# Patient Record
Sex: MALE | Race: WHITE | NOT HISPANIC OR LATINO | Employment: OTHER | URBAN - METROPOLITAN AREA
[De-identification: names, ages, dates, MRNs, and addresses within clinical notes are randomized per-mention and may not be internally consistent; named-entity substitution may affect disease eponyms.]

---

## 2024-02-20 ENCOUNTER — ANESTHESIA EVENT (OUTPATIENT)
Dept: SURGERY | Facility: CLINIC | Age: 69
End: 2024-02-20
Payer: MEDICARE

## 2024-02-20 NOTE — ANESTHESIA PREPROCEDURE EVALUATION
Anesthesia Pre-Procedure Evaluation    Patient: David Ortiz   MRN: [id number] : 1955        Procedure : Procedure(s):  Right Wrist Scaphoid Excision, Possible Triquetral Excision and Capitolunate Arthrodesis  Iliac Crest Bone Graft          No past medical history on file.   SHOULDER SURGERY   Not on File   Social History     Tobacco Use    Smoking status: Not on file    Smokeless tobacco: Not on file   Substance Use Topics    Alcohol use: Not on file      Wt Readings from Last 1 Encounters:   No data found for Wt        Anesthesia Evaluation   Pt has had prior anesthetic. Type: General.        ROS/MED HX  ENT/Pulmonary:     (+)                      asthma                  Neurologic:       Cardiovascular:     (+) Dyslipidemia hypertension- -  CAD -  - -   Taking blood thinners                     dysrhythmias, a-fib,             METS/Exercise Tolerance:     Hematologic:       Musculoskeletal:   (+)  arthritis,             GI/Hepatic:     (+) GERD,                   Renal/Genitourinary:     (+) renal disease, type: CRI,            Endo:     (+)               Obesity,       Psychiatric/Substance Use:     (+) psychiatric history depression       Infectious Disease:       Malignancy:       Other:      (+)  , H/O Chronic Pain,         Physical Exam    Airway        Mallampati: II   TM distance: > 3 FB   Neck ROM: full   Mouth opening: > 3 cm    Respiratory Devices and Support         Dental       (+) Minor Abnormalities - some fillings, tiny chips      Cardiovascular   cardiovascular exam normal       Rhythm and rate: regular and normal     Pulmonary           breath sounds clear to auscultation           OUTSIDE LABS:  CBC:   Lab Results   Component Value Date    WBC 7.2 2006    HGB 16.5 2006    HCT 49.4 2006     2006     BMP:   Lab Results   Component Value Date     2006    POTASSIUM 4.2 2006    CHLORIDE 107 2006    CO2 24 2006    BUN 26  "09/19/2006    CR 1.50 09/19/2006     09/19/2006     COAGS: No results found for: \"PTT\", \"INR\", \"FIBR\"  POC: No results found for: \"BGM\", \"HCG\", \"HCGS\"  HEPATIC:   Lab Results   Component Value Date    ALBUMIN 4.6 09/19/2006    PROTTOTAL 8.2 09/19/2006    ALT 44 09/19/2006    AST 28 09/19/2006    GGT 46 09/19/2006    ALKPHOS 85 09/19/2006    BILITOTAL 0.5 09/19/2006     OTHER:   Lab Results   Component Value Date    JOJO 10.1 09/19/2006       Anesthesia Plan    ASA Status:  2    NPO Status:  NPO Appropriate    Anesthesia Type: General.     - Airway: LMA   Induction: Intravenous, Propofol.   Maintenance: Balanced.        Consents    Anesthesia Plan(s) and associated risks, benefits, and realistic alternatives discussed. Questions answered and patient/representative(s) expressed understanding.     - Discussed: Risks, Benefits and Alternatives for BOTH SEDATION and the PROCEDURE were discussed     - Discussed with:  Patient            Postoperative Care    Pain management: Peripheral nerve block (Single Shot), IV analgesics, Oral pain medications, Multi-modal analgesia.   PONV prophylaxis: Ondansetron (or other 5HT-3), Dexamethasone or Solumedrol     Comments:               SAUL Casas CRNA    I have reviewed the pertinent notes and labs in the chart from the past 30 days and (re)examined the patient.  Any updates or changes from those notes are reflected in this note.                  "

## 2024-03-05 RX ORDER — TRAZODONE HYDROCHLORIDE 50 MG/1
100-150 TABLET, FILM COATED ORAL
COMMUNITY
Start: 2024-02-29 | End: 2024-03-05

## 2024-03-05 RX ORDER — TRAZODONE HYDROCHLORIDE 50 MG/1
50 TABLET, FILM COATED ORAL AT BEDTIME
COMMUNITY

## 2024-03-05 RX ORDER — TRAMADOL HYDROCHLORIDE 50 MG/1
1 TABLET ORAL EVERY 6 HOURS PRN
COMMUNITY
Start: 2024-02-29

## 2024-03-05 RX ORDER — ACETAMINOPHEN 500 MG
1000 TABLET ORAL
COMMUNITY

## 2024-03-05 RX ORDER — QUETIAPINE FUMARATE 200 MG/1
1 TABLET, FILM COATED ORAL AT BEDTIME
COMMUNITY
Start: 2024-02-29

## 2024-03-05 RX ORDER — NITROGLYCERIN 0.4 MG/1
0.4 TABLET SUBLINGUAL
COMMUNITY
Start: 2024-02-29

## 2024-03-05 RX ORDER — ALLOPURINOL 100 MG/1
1 TABLET ORAL DAILY
COMMUNITY
Start: 2023-03-20

## 2024-03-05 RX ORDER — BUDESONIDE AND FORMOTEROL FUMARATE DIHYDRATE 160; 4.5 UG/1; UG/1
2 AEROSOL RESPIRATORY (INHALATION)
COMMUNITY
Start: 2022-10-18

## 2024-03-05 RX ORDER — LOSARTAN POTASSIUM 50 MG/1
1 TABLET ORAL DAILY
COMMUNITY
Start: 2023-03-24

## 2024-03-05 RX ORDER — METHOCARBAMOL 750 MG/1
750 TABLET, FILM COATED ORAL
COMMUNITY
Start: 2022-10-18

## 2024-03-05 RX ORDER — METOPROLOL SUCCINATE 25 MG/1
1 TABLET, EXTENDED RELEASE ORAL EVERY MORNING
COMMUNITY
Start: 2023-01-23

## 2024-03-05 RX ORDER — SIMVASTATIN 40 MG
1 TABLET ORAL DAILY
COMMUNITY
Start: 2023-01-23

## 2024-03-05 RX ORDER — WARFARIN SODIUM 5 MG/1
5 TABLET ORAL
COMMUNITY
Start: 2023-02-13

## 2024-03-12 ENCOUNTER — HOSPITAL ENCOUNTER (OUTPATIENT)
Facility: CLINIC | Age: 69
Discharge: HOME | End: 2024-03-12
Attending: ORTHOPAEDIC SURGERY | Admitting: ORTHOPAEDIC SURGERY
Payer: MEDICARE

## 2024-03-12 ENCOUNTER — ANESTHESIA (OUTPATIENT)
Dept: SURGERY | Facility: CLINIC | Age: 69
End: 2024-03-12
Payer: MEDICARE

## 2024-03-12 ENCOUNTER — APPOINTMENT (OUTPATIENT)
Dept: GENERAL RADIOLOGY | Facility: CLINIC | Age: 69
End: 2024-03-12
Attending: ORTHOPAEDIC SURGERY
Payer: MEDICARE

## 2024-03-12 VITALS
HEIGHT: 72 IN | DIASTOLIC BLOOD PRESSURE: 79 MMHG | HEART RATE: 67 BPM | OXYGEN SATURATION: 94 % | TEMPERATURE: 97 F | WEIGHT: 257 LBS | BODY MASS INDEX: 34.81 KG/M2 | RESPIRATION RATE: 15 BRPM | SYSTOLIC BLOOD PRESSURE: 125 MMHG

## 2024-03-12 DIAGNOSIS — M19.039 WRIST ARTHRITIS: Primary | ICD-10-CM

## 2024-03-12 LAB
ERYTHROCYTE [DISTWIDTH] IN BLOOD BY AUTOMATED COUNT: 16.4 % (ref 10–15)
HCT VFR BLD AUTO: 46.6 % (ref 40–53)
HGB BLD-MCNC: 15 G/DL (ref 13.3–17.7)
INR PPP: 1.12 (ref 0.85–1.15)
MCH RBC QN AUTO: 28.1 PG (ref 26.5–33)
MCHC RBC AUTO-ENTMCNC: 32.2 G/DL (ref 31.5–36.5)
MCV RBC AUTO: 87 FL (ref 78–100)
PLATELET # BLD AUTO: 285 10E3/UL (ref 150–450)
RBC # BLD AUTO: 5.33 10E6/UL (ref 4.4–5.9)
WBC # BLD AUTO: 9.1 10E3/UL (ref 4–11)

## 2024-03-12 PROCEDURE — 85610 PROTHROMBIN TIME: CPT

## 2024-03-12 PROCEDURE — 250N000013 HC RX MED GY IP 250 OP 250 PS 637: Performed by: NURSE ANESTHETIST, CERTIFIED REGISTERED

## 2024-03-12 PROCEDURE — 710N000012 HC RECOVERY PHASE 2, PER MINUTE: Performed by: ORTHOPAEDIC SURGERY

## 2024-03-12 PROCEDURE — 272N000001 HC OR GENERAL SUPPLY STERILE: Performed by: ORTHOPAEDIC SURGERY

## 2024-03-12 PROCEDURE — 250N000009 HC RX 250: Performed by: NURSE ANESTHETIST, CERTIFIED REGISTERED

## 2024-03-12 PROCEDURE — 250N000011 HC RX IP 250 OP 636: Performed by: PHYSICIAN ASSISTANT

## 2024-03-12 PROCEDURE — 710N000009 HC RECOVERY PHASE 1, LEVEL 1, PER MIN: Performed by: ORTHOPAEDIC SURGERY

## 2024-03-12 PROCEDURE — 250N000011 HC RX IP 250 OP 636

## 2024-03-12 PROCEDURE — 250N000013 HC RX MED GY IP 250 OP 250 PS 637: Performed by: PHYSICIAN ASSISTANT

## 2024-03-12 PROCEDURE — 360N000083 HC SURGERY LEVEL 3 W/ FLUORO, PER MIN: Performed by: ORTHOPAEDIC SURGERY

## 2024-03-12 PROCEDURE — 36415 COLL VENOUS BLD VENIPUNCTURE: CPT

## 2024-03-12 PROCEDURE — 999N000141 HC STATISTIC PRE-PROCEDURE NURSING ASSESSMENT: Performed by: ORTHOPAEDIC SURGERY

## 2024-03-12 PROCEDURE — 258N000003 HC RX IP 258 OP 636: Performed by: NURSE ANESTHETIST, CERTIFIED REGISTERED

## 2024-03-12 PROCEDURE — 250N000011 HC RX IP 250 OP 636: Performed by: NURSE ANESTHETIST, CERTIFIED REGISTERED

## 2024-03-12 PROCEDURE — 250N000025 HC SEVOFLURANE, PER MIN: Performed by: ORTHOPAEDIC SURGERY

## 2024-03-12 PROCEDURE — 85027 COMPLETE CBC AUTOMATED: CPT

## 2024-03-12 PROCEDURE — C1713 ANCHOR/SCREW BN/BN,TIS/BN: HCPCS | Performed by: ORTHOPAEDIC SURGERY

## 2024-03-12 PROCEDURE — 999N000179 XR SURGERY CARM FLUORO LESS THAN 5 MIN W STILLS: Mod: TC

## 2024-03-12 PROCEDURE — 250N000009 HC RX 250: Performed by: ORTHOPAEDIC SURGERY

## 2024-03-12 PROCEDURE — 370N000017 HC ANESTHESIA TECHNICAL FEE, PER MIN: Performed by: ORTHOPAEDIC SURGERY

## 2024-03-12 PROCEDURE — 250N000011 HC RX IP 250 OP 636: Performed by: ORTHOPAEDIC SURGERY

## 2024-03-12 PROCEDURE — 88304 TISSUE EXAM BY PATHOLOGIST: CPT | Mod: TC | Performed by: ORTHOPAEDIC SURGERY

## 2024-03-12 DEVICE — 30.0MM, MINI ACUTRAK 2® BONE SCREW
Type: IMPLANTABLE DEVICE | Site: WRIST | Status: FUNCTIONAL
Brand: ACUMED

## 2024-03-12 DEVICE — IMP WIRE KIRSCHNER 0.062X4" 1646-10-000: Type: IMPLANTABLE DEVICE | Site: WRIST | Status: FUNCTIONAL

## 2024-03-12 DEVICE — IMP WIRE KIRSCHNER 0.045X4" 1611-10-000: Type: IMPLANTABLE DEVICE | Site: WRIST | Status: FUNCTIONAL

## 2024-03-12 DEVICE — 26.0MM, MINI ACUTRAK 2® BONE SCREW
Type: IMPLANTABLE DEVICE | Site: WRIST | Status: FUNCTIONAL
Brand: ACUMED

## 2024-03-12 DEVICE — IMP WIRE KIRSCHNER 0.054X4" 1645-10-000: Type: IMPLANTABLE DEVICE | Site: WRIST | Status: FUNCTIONAL

## 2024-03-12 RX ORDER — FENTANYL CITRATE 50 UG/ML
25 INJECTION, SOLUTION INTRAMUSCULAR; INTRAVENOUS EVERY 5 MIN PRN
Status: DISCONTINUED | OUTPATIENT
Start: 2024-03-12 | End: 2024-03-12 | Stop reason: HOSPADM

## 2024-03-12 RX ORDER — FENTANYL CITRATE 0.05 MG/ML
INJECTION, SOLUTION INTRAMUSCULAR; INTRAVENOUS PRN
Status: DISCONTINUED | OUTPATIENT
Start: 2024-03-12 | End: 2024-03-12

## 2024-03-12 RX ORDER — HYDROXYZINE PAMOATE 25 MG/1
25 CAPSULE ORAL 4 TIMES DAILY PRN
Qty: 18 CAPSULE | Refills: 0 | Status: SHIPPED | OUTPATIENT
Start: 2024-03-12

## 2024-03-12 RX ORDER — HYDROMORPHONE HCL IN WATER/PF 6 MG/30 ML
0.4 PATIENT CONTROLLED ANALGESIA SYRINGE INTRAVENOUS EVERY 5 MIN PRN
Status: DISCONTINUED | OUTPATIENT
Start: 2024-03-12 | End: 2024-03-12 | Stop reason: HOSPADM

## 2024-03-12 RX ORDER — OXYCODONE HYDROCHLORIDE 5 MG/1
5 TABLET ORAL
Status: DISCONTINUED | OUTPATIENT
Start: 2024-03-12 | End: 2024-03-12 | Stop reason: HOSPADM

## 2024-03-12 RX ORDER — ONDANSETRON 4 MG/1
4 TABLET, ORALLY DISINTEGRATING ORAL EVERY 30 MIN PRN
Status: DISCONTINUED | OUTPATIENT
Start: 2024-03-12 | End: 2024-03-12 | Stop reason: HOSPADM

## 2024-03-12 RX ORDER — SODIUM CHLORIDE, SODIUM LACTATE, POTASSIUM CHLORIDE, CALCIUM CHLORIDE 600; 310; 30; 20 MG/100ML; MG/100ML; MG/100ML; MG/100ML
INJECTION, SOLUTION INTRAVENOUS CONTINUOUS
Status: DISCONTINUED | OUTPATIENT
Start: 2024-03-12 | End: 2024-03-12 | Stop reason: HOSPADM

## 2024-03-12 RX ORDER — LIDOCAINE 40 MG/G
CREAM TOPICAL
Status: DISCONTINUED | OUTPATIENT
Start: 2024-03-12 | End: 2024-03-12 | Stop reason: HOSPADM

## 2024-03-12 RX ORDER — OXYCODONE HYDROCHLORIDE 5 MG/1
10 TABLET ORAL
Status: DISCONTINUED | OUTPATIENT
Start: 2024-03-12 | End: 2024-03-12 | Stop reason: HOSPADM

## 2024-03-12 RX ORDER — FENTANYL CITRATE 50 UG/ML
25 INJECTION, SOLUTION INTRAMUSCULAR; INTRAVENOUS
Status: DISCONTINUED | OUTPATIENT
Start: 2024-03-12 | End: 2024-03-12 | Stop reason: HOSPADM

## 2024-03-12 RX ORDER — OXYCODONE HYDROCHLORIDE 5 MG/1
5 TABLET ORAL
Status: COMPLETED | OUTPATIENT
Start: 2024-03-12 | End: 2024-03-12

## 2024-03-12 RX ORDER — OXYCODONE HYDROCHLORIDE 5 MG/1
5-10 TABLET ORAL EVERY 4 HOURS PRN
Qty: 24 TABLET | Refills: 0 | Status: SHIPPED | OUTPATIENT
Start: 2024-03-12

## 2024-03-12 RX ORDER — FENTANYL CITRATE 50 UG/ML
50 INJECTION, SOLUTION INTRAMUSCULAR; INTRAVENOUS EVERY 5 MIN PRN
Status: DISCONTINUED | OUTPATIENT
Start: 2024-03-12 | End: 2024-03-12 | Stop reason: HOSPADM

## 2024-03-12 RX ORDER — LIDOCAINE HYDROCHLORIDE 20 MG/ML
INJECTION, SOLUTION INFILTRATION; PERINEURAL PRN
Status: DISCONTINUED | OUTPATIENT
Start: 2024-03-12 | End: 2024-03-12

## 2024-03-12 RX ORDER — ONDANSETRON 2 MG/ML
4 INJECTION INTRAMUSCULAR; INTRAVENOUS EVERY 30 MIN PRN
Status: DISCONTINUED | OUTPATIENT
Start: 2024-03-12 | End: 2024-03-12 | Stop reason: HOSPADM

## 2024-03-12 RX ORDER — MAGNESIUM HYDROXIDE 1200 MG/15ML
LIQUID ORAL PRN
Status: DISCONTINUED | OUTPATIENT
Start: 2024-03-12 | End: 2024-03-12 | Stop reason: HOSPADM

## 2024-03-12 RX ORDER — NALOXONE HYDROCHLORIDE 0.4 MG/ML
0.1 INJECTION, SOLUTION INTRAMUSCULAR; INTRAVENOUS; SUBCUTANEOUS
Status: DISCONTINUED | OUTPATIENT
Start: 2024-03-12 | End: 2024-03-12 | Stop reason: HOSPADM

## 2024-03-12 RX ORDER — GABAPENTIN 100 MG/1
100 CAPSULE ORAL ONCE
Status: COMPLETED | OUTPATIENT
Start: 2024-03-12 | End: 2024-03-12

## 2024-03-12 RX ORDER — ACETAMINOPHEN 325 MG/1
975 TABLET ORAL ONCE
Status: DISCONTINUED | OUTPATIENT
Start: 2024-03-12 | End: 2024-03-12

## 2024-03-12 RX ORDER — PROPOFOL 10 MG/ML
INJECTION, EMULSION INTRAVENOUS PRN
Status: DISCONTINUED | OUTPATIENT
Start: 2024-03-12 | End: 2024-03-12

## 2024-03-12 RX ORDER — ROPIVACAINE HYDROCHLORIDE 5 MG/ML
INJECTION, SOLUTION EPIDURAL; INFILTRATION; PERINEURAL
Status: COMPLETED | OUTPATIENT
Start: 2024-03-12 | End: 2024-03-12

## 2024-03-12 RX ORDER — ONDANSETRON 2 MG/ML
INJECTION INTRAMUSCULAR; INTRAVENOUS PRN
Status: DISCONTINUED | OUTPATIENT
Start: 2024-03-12 | End: 2024-03-12

## 2024-03-12 RX ORDER — DEXAMETHASONE SODIUM PHOSPHATE 4 MG/ML
INJECTION, SOLUTION INTRA-ARTICULAR; INTRALESIONAL; INTRAMUSCULAR; INTRAVENOUS; SOFT TISSUE PRN
Status: DISCONTINUED | OUTPATIENT
Start: 2024-03-12 | End: 2024-03-12

## 2024-03-12 RX ORDER — CEFAZOLIN SODIUM/WATER 2 G/20 ML
2 SYRINGE (ML) INTRAVENOUS
Status: COMPLETED | OUTPATIENT
Start: 2024-03-12 | End: 2024-03-12

## 2024-03-12 RX ORDER — CEFAZOLIN SODIUM/WATER 2 G/20 ML
2 SYRINGE (ML) INTRAVENOUS SEE ADMIN INSTRUCTIONS
Status: DISCONTINUED | OUTPATIENT
Start: 2024-03-12 | End: 2024-03-12 | Stop reason: HOSPADM

## 2024-03-12 RX ORDER — DEXAMETHASONE SODIUM PHOSPHATE 10 MG/ML
INJECTION, SOLUTION INTRAMUSCULAR; INTRAVENOUS
Status: COMPLETED | OUTPATIENT
Start: 2024-03-12 | End: 2024-03-12

## 2024-03-12 RX ORDER — HYDROMORPHONE HCL IN WATER/PF 6 MG/30 ML
0.2 PATIENT CONTROLLED ANALGESIA SYRINGE INTRAVENOUS EVERY 5 MIN PRN
Status: DISCONTINUED | OUTPATIENT
Start: 2024-03-12 | End: 2024-03-12 | Stop reason: HOSPADM

## 2024-03-12 RX ORDER — BUPIVACAINE HYDROCHLORIDE 5 MG/ML
INJECTION, SOLUTION PERINEURAL PRN
Status: DISCONTINUED | OUTPATIENT
Start: 2024-03-12 | End: 2024-03-12 | Stop reason: HOSPADM

## 2024-03-12 RX ORDER — ACETAMINOPHEN 325 MG/1
975 TABLET ORAL ONCE
Status: COMPLETED | OUTPATIENT
Start: 2024-03-12 | End: 2024-03-12

## 2024-03-12 RX ADMIN — ACETAMINOPHEN 975 MG: 325 TABLET, FILM COATED ORAL at 12:57

## 2024-03-12 RX ADMIN — DEXAMETHASONE SODIUM PHOSPHATE 4 MG: 10 INJECTION, SOLUTION INTRAMUSCULAR; INTRAVENOUS at 13:38

## 2024-03-12 RX ADMIN — PROPOFOL 200 MG: 10 INJECTION, EMULSION INTRAVENOUS at 15:02

## 2024-03-12 RX ADMIN — GABAPENTIN 100 MG: 100 CAPSULE ORAL at 12:57

## 2024-03-12 RX ADMIN — MIDAZOLAM 2 MG: 1 INJECTION INTRAMUSCULAR; INTRAVENOUS at 14:58

## 2024-03-12 RX ADMIN — DEXAMETHASONE SODIUM PHOSPHATE 4 MG: 4 INJECTION, SOLUTION INTRA-ARTICULAR; INTRALESIONAL; INTRAMUSCULAR; INTRAVENOUS; SOFT TISSUE at 15:09

## 2024-03-12 RX ADMIN — FENTANYL CITRATE 100 MCG: 0.05 INJECTION, SOLUTION INTRAMUSCULAR; INTRAVENOUS at 14:58

## 2024-03-12 RX ADMIN — MIDAZOLAM 2 MG: 1 INJECTION INTRAMUSCULAR; INTRAVENOUS at 13:23

## 2024-03-12 RX ADMIN — ONDANSETRON 4 MG: 2 INJECTION INTRAMUSCULAR; INTRAVENOUS at 15:09

## 2024-03-12 RX ADMIN — OXYCODONE HYDROCHLORIDE 5 MG: 5 TABLET ORAL at 18:30

## 2024-03-12 RX ADMIN — SODIUM CHLORIDE, POTASSIUM CHLORIDE, SODIUM LACTATE AND CALCIUM CHLORIDE: 600; 310; 30; 20 INJECTION, SOLUTION INTRAVENOUS at 12:55

## 2024-03-12 RX ADMIN — ROPIVACAINE HYDROCHLORIDE 40 ML: 5 INJECTION, SOLUTION EPIDURAL; INFILTRATION; PERINEURAL at 13:38

## 2024-03-12 RX ADMIN — FENTANYL CITRATE 100 MCG: 0.05 INJECTION, SOLUTION INTRAMUSCULAR; INTRAVENOUS at 13:23

## 2024-03-12 RX ADMIN — LIDOCAINE HYDROCHLORIDE 100 MG: 20 INJECTION, SOLUTION INFILTRATION; PERINEURAL at 15:02

## 2024-03-12 RX ADMIN — Medication 2 G: at 14:58

## 2024-03-12 ASSESSMENT — ACTIVITIES OF DAILY LIVING (ADL)
ADLS_ACUITY_SCORE: 31
ADLS_ACUITY_SCORE: 33
ADLS_ACUITY_SCORE: 34

## 2024-03-12 ASSESSMENT — ENCOUNTER SYMPTOMS: DYSRHYTHMIAS: 1

## 2024-03-12 NOTE — ANESTHESIA POSTPROCEDURE EVALUATION
Patient: David Ortiz    Procedure: Procedure(s):  Right Wrist Scaphoid Excision, Triquetral Excision and Capitolunate Arthrodesis  Iliac Crest Bone Graft Right       Anesthesia Type:  General    Note:  Disposition: Outpatient   Postop Pain Control: Uneventful            Sign Out: Well controlled pain   PONV: No   Neuro/Psych: Uneventful            Sign Out: Acceptable/Baseline neuro status   Airway/Respiratory: Uneventful            Sign Out: Acceptable/Baseline resp. status   CV/Hemodynamics: Uneventful            Sign Out: Acceptable CV status; No obvious hypovolemia; No obvious fluid overload   Other NRE: NONE   DID A NON-ROUTINE EVENT OCCUR? No           Last vitals:  Vitals Value Taken Time   /84 03/12/24 1700   Temp 35.9  C (96.62  F) 03/12/24 1710   Pulse 61 03/12/24 1710   Resp 12 03/12/24 1710   SpO2 93 % 03/12/24 1710   Vitals shown include unfiled device data.    Electronically Signed By: SAUL Atkinson CRNA  March 12, 2024  5:12 PM

## 2024-03-12 NOTE — ANESTHESIA PROCEDURE NOTES
Airway       Patient location during procedure: OR       Procedure Start/Stop Times: 3/12/2024 3:05 PM  Staff -        CRNA: Avi Recinos APRN CRNA       Performed By: CRNA  Consent for Airway        Urgency: elective  Indications and Patient Condition       Indications for airway management: livan-procedural       Induction type:intravenous       Mask difficulty assessment: 1 - vent by mask    Final Airway Details       Final airway type: supraglottic airway    Supraglottic Airway Details        Type: LMA       LMA size: 5    Post intubation assessment        Placement verified by: capnometry, equal breath sounds and chest rise        Number of attempts at approach: 1       Secured with: tape       Ease of procedure: easy       Dentition: Intact and Unchanged    Medication(s) Administered   Medication Administration Time: 3/12/2024 3:05 PM

## 2024-03-12 NOTE — ANESTHESIA CARE TRANSFER NOTE
Patient: David Ortiz    Procedure: Procedure(s):  Right Wrist Scaphoid Excision, Triquetral Excision and Capitolunate Arthrodesis  Iliac Crest Bone Graft Right       Diagnosis: SLAC (scapholunate advanced collapse) of wrist [M19.139]  Diagnosis Additional Information: No value filed.    Anesthesia Type:   General     Note:    Oropharynx: oropharynx clear of all foreign objects and spontaneously breathing  Level of Consciousness: awake and drowsy  Oxygen Supplementation: room air    Independent Airway: airway patency satisfactory and stable  Dentition: dentition unchanged  Vital Signs Stable: post-procedure vital signs reviewed and stable  Report to RN Given: handoff report given  Patient transferred to: Phase II    Handoff Report: Identifed the Patient, Identified the Reponsible Provider, Reviewed the pertinent medical history, Discussed the surgical course, Reviewed Intra-OP anesthesia mangement and issues during anesthesia, Set expectations for post-procedure period and Allowed opportunity for questions and acknowledgement of understanding      Vitals:  Vitals Value Taken Time   BP     Temp     Pulse     Resp     SpO2         Electronically Signed By: SAUL Atkinson CRNA  March 12, 2024  4:51 PM

## 2024-03-12 NOTE — ANESTHESIA PROCEDURE NOTES
Brachial plexus Procedure Note    Pre-Procedure   Staff -        CRNA: Benson Thao APRN CRNA       Performed By: CRNA       Procedure Start/Stop Times: 3/12/2024 1:23 PM and 3/12/2024 1:40 PM       Pre-Anesthestic Checklist: patient identified, IV checked, site marked, risks and benefits discussed, informed consent, monitors and equipment checked, pre-op evaluation, at physician/surgeon's request and post-op pain management  Timeout:       Correct Patient: Yes        Correct Procedure: Yes        Correct Site: Yes        Correct Position: Yes        Correct Laterality: Yes        Site Marked: Yes  Procedure Documentation  Procedure: Brachial plexus       Laterality: right       Patient Position: supine       Patient Prep/Sterile Barriers: sterile gloves, mask, patient draped       Skin prep: Chloraprep (infra-clavicular approach).       Needle Type: insulated       Needle Gauge: 21.        Needle Length (Inches): 4        Ultrasound guided       1. Ultrasound was used to identify targeted nerve, plexus, vascular marker, or fascial plane and place a needle adjacent to it in real-time.       2. Ultrasound was used to visualize the spread of anesthetic in close proximity to the above referenced structure.       3. A permanent image is entered into the patient's record.       4. The visualized anatomic structures appeared normal.       5. There were no apparent abnormal pathologic findings.    Assessment/Narrative         The placement was negative for: blood aspirated, painful injection and site bleeding       Paresthesias: No.       Bolus given via needle. no blood aspirated via catheter.        Secured via.        Insertion/Infusion Method: Single Shot       Complications: none       Injection made incrementally with aspirations every 5 mL.    Medication(s) Administered   Ropivacaine 0.5% PF (Infiltration) - Infiltration   40 mL - 3/12/2024 1:38:00 PM  Dexamethasone 10 mg/mL PF (Perineural) - Perineural   4 mg  "- 3/12/2024 1:38:00 PM  Medication Administration Time: 3/12/2024 1:23 PM     Comments:  Patient tolerated procedure well      FOR [facility] ONLY:   Pain Team Contact information: please page the Pain Team Via [employer]. Search \"Pain\". During daytime hours, please page the attending first. At night please page the resident first.      "

## 2024-03-12 NOTE — OP NOTE
Preoperative diagnosis:  Right wrist chronic pain  Right wrist scapholunate advanced collapse, stage III, arthritis    Postoperative diagnosis:  Right wrist chronic pain  Right wrist scapholunate advanced collapse, stage III, arthritis    Procedure:  Excision right scaphoid and triquetrum  Right wrist Lunate arthrodesis with iliac crest bone graft  Right wrist posterior interosseous neurectomy    Anesthesia: General with regional    Surgeon: Samy Cheatham MD    Assistant: Sonja Burgos PA-C    Procedure: The patient was taken to the main operating suite.  Once there he was transferred over the operating table in supine position.  He was induced per anesthesia.  The right upper extremity was prepped and draped in usual sterile fashion as was the right iliac crest.  Longitudinal incision was made and full-thickness skin and subcutaneous flaps were raised over the dorsum of the wrist.  The third dorsal compartment was entered and the EPL was removed.  The septum between the third and the fourth as well as the fourth and fifth was divided.  A 2 cm segment of the posterior interosseous nerve was resected in its entirety.  A T-shaped capsulotomy of the wrist was undertaken.  There were small ossicles dorsally which were removed.  There was quite a few crystalline deposits.  There is complete loss of articular cartilage in the scaphoid proximal pole of the capitate the distal pole of the lunate.  Subperiosteal dissection was carried around the scaphoid and it was removed in its entirety in 1 piece.  Similarly this was done to the triquetrum.  We then used a high-speed bur to bur down to a bleeding bed of bone on the proximal pole of the scaphoid and the distal pole of the lunate.  We packed cancellous bone graft between the capitate and the lunate and fixation was achieved with 2 mini AccuTrack screws.  Excellent longitudinal compression and stability was achieved and the DISI alignment was resolved.  The wound was  irrigated and closed with 3-0 FiberWire in a figure-of-eight fashion for the T-shaped capsulotomy.  The dorsal retinaculum was closed with 4-0 FiberWire in a running locked fashion leaving the EPL out.  Skin was closed with 4 nylon in a horizontal mattress fashion.  A dressing consisting of Adaptic gauze 4 x 4 fluffs sterile Webril and a volar plaster splint covered by Ace bandages was applied.  The tourniquet was let down for a total tourniquet time of 79 minutes.    The iliac crest graft was harvested through a 3 cm incision just inferior and posterior to the anterior superior iliac spine.  Sharp dissection was carried onto the superior aspect of the crest and subperiosteal dissection was used to expose the crest.  Small window was made and cancellous bone graft was harvested.  Excellent hemostasis was achieved.  The fascia was closed with 0 Vicryl in a figure-of-eight fashion.  Subcutaneous tissue was closed with 2-0 Vicryl in simple suture fashion.  Skin was closed with 3 oh STRATAFIX in a running subcuticular fashion.  Benzoin and Steri-Strips were applied.  A dressing consisting of Adaptic gauze 4 x 4 fluffs ABDs and tape was then applied.    The patient was taken recovery room in stable condition breathing spontaneously.    The PA was medically necessary to ensure smooth and safe progression of the case.  The PA was critical for protection of the articular structures during the course of excising the scaphoid and triquetrum.  The PA was also critical for exposure of the lunate while performing placement of the hardware/fixation.  The PA was present for the entire case from positioning prepping and draping through wound closure and application of the dressing.

## 2024-03-12 NOTE — DISCHARGE INSTRUCTIONS
Same Day Surgery Discharge Instructions  Special Precautions After Surgery - Adult    It is not unusual to feel lightheaded or faint, up to 24 hours after surgery or while taking pain medication.  If you have these symptoms; sit for a few minutes before standing and have someone assist you when getting up.  You should rest and relax for the next 24 hours and must have someone stay with you for at least 24 hours after your discharge.  DO NOT DRIVE any vehicle or operate mechanical equipment for 24 hours following the end of your surgery.  DO NOT DRIVE while taking narcotic pain medications that have been prescribed by your physician.  If you had a limb operated on, you must be able to use it fully to drive.  DO NOT drink alcoholic beverages for 24 hours following surgery or while taking prescription pain medication.  Drink clear liquids (apple juice, ginger ale, broth, 7-Up, etc.).  Progress to your regular diet as you feel able.  Any questions call your physician and do not make important decisions for 24 hours.     __________________________________________________________________________________________________________________________________  IMPORTANT NUMBERS:    [facility] [place] Main Number:  [phone], [phone]  Pharmacy:  [phone]  Same Day Surgery:  [phone], Monday - Friday until 8:30 p.m.  Urgent Care:  [phone]  Emergency Room:  [phone]        [facility]:  [phone]     Nurse Advice Line: [phone]

## 2024-03-12 NOTE — OR NURSING
[facility] DISCHARGE NOTE    Patient discharged to home at 6:39 PM via wheel chair. Accompanied by spouse and staff. Discharge instructions reviewed with patient and spouse, opportunity offered to ask questions. Prescriptions filled and sent with patient upon discharge. All belongings sent with patient.    Nafisa Lujan RN

## 2024-03-18 LAB
PATH REPORT.COMMENTS IMP SPEC: NORMAL
PATH REPORT.FINAL DX SPEC: NORMAL
PATH REPORT.GROSS SPEC: NORMAL
PATH REPORT.MICROSCOPIC SPEC OTHER STN: NORMAL
PATH REPORT.RELEVANT HX SPEC: NORMAL
PHOTO IMAGE: NORMAL

## 2024-03-18 PROCEDURE — 88341 IMHCHEM/IMCYTCHM EA ADD ANTB: CPT | Mod: 26 | Performed by: PATHOLOGY

## 2024-03-18 PROCEDURE — 88305 TISSUE EXAM BY PATHOLOGIST: CPT | Mod: 26 | Performed by: PATHOLOGY

## 2024-03-18 PROCEDURE — 88342 IMHCHEM/IMCYTCHM 1ST ANTB: CPT | Mod: 26 | Performed by: PATHOLOGY

## 2024-03-18 PROCEDURE — 88311 DECALCIFY TISSUE: CPT | Mod: 26 | Performed by: PATHOLOGY

## 2024-06-10 ENCOUNTER — HOSPITAL ENCOUNTER (OUTPATIENT)
Dept: CT IMAGING | Facility: CLINIC | Age: 69
Discharge: HOME | End: 2024-06-10
Attending: ORTHOPAEDIC SURGERY | Admitting: ORTHOPAEDIC SURGERY
Payer: MEDICARE

## 2024-06-10 DIAGNOSIS — M25.539 WRIST PAIN: ICD-10-CM

## 2024-06-10 PROCEDURE — 73200 CT UPPER EXTREMITY W/O DYE: CPT | Mod: RT

## 2025-06-02 PROBLEM — I25.10 ATHEROSCLEROTIC HEART DISEASE OF NATIVE CORONARY ARTERY WITHOUT ANGINA PECTORIS: Status: ACTIVE | Noted: 2019-03-08

## 2025-06-02 PROBLEM — Z86.711 HISTORY OF PULMONARY EMBOLISM: Status: ACTIVE | Noted: 2023-04-21

## 2025-06-02 PROBLEM — I48.91 ATRIAL FIBRILLATION (H): Status: ACTIVE | Noted: 2023-04-14

## 2025-06-03 NOTE — PROGRESS NOTES
Reason for Consultation: Paroxysmal atrial fibrillation, recent emergency department visit for lightheadedness/presyncope.      History of Presenting Illness: This is a 70-year-old gentleman with a past medical history significant for paroxysmal atrial fibrillation and a prior pulmonary embolism as well as dyslipidemia who was seen in the emergency department in early May of this year for lightheadedness/presyncope.  His emergency department evaluation was essentially unremarkable and cardiology consultation was requested.    He states that he has a long history of presyncope as well as syncope over the past several years although the symptoms are becoming more frequent lately.  He describes a prodrome of dizziness prior to losing consciousness.  The symptoms can occur both post exertion and at rest, and he denies any preceding chest discomfort or palpitations.  He does, however, report dyspnea on exertion which is slightly more noticeable lately.    He has undergone a workup for this in the past in [place] which included cardiac monitoring per his recollection.  This was unrevealing.  He has also undergone coronary angiography in the past which demonstrated nonobstructive coronary artery disease.    He does monitor his blood pressure when the symptoms occur and per his wife his blood pressure has not demonstrated any significant changes with the symptoms.    PMH, FH, SH, Allergies and Meds: As outlined below.    Physical Exam:    Gen: NAD  Respiratory : Clear to Auscultation.  Cardiovascular: RRR, no murmurs  Extremities: No edema.    Labs: Lipid panel on 11/29/2023 demonstrated total cholesterol 148, HDL of 90, LDL of 39 and triglycerides of 94.    Other Cardiac Testing:    He underwent a dobutamine stress echocardiogram on 8/17/2023 which was negative for ischemia.      IMPRESSION:    Recurrent presyncope/syncope as described above.  2.  Paroxysmal atrial fibrillation.  On Xarelto 15 mg daily due to chronic  renal insufficiency.  3.  History of pulmonary embolism in the past.  On Xarelto as above.  4.  Mild to moderate nonobstructive coronary disease on coronary angiography in the past.  5.  Chronic renal insufficiency.          PLAN:    Mr. Ortiz presents for evaluation regarding recurrent syncope/presyncope as described above.  This has been ongoing for several years although it appears to be more frequent this year.  As stated above, the symptoms are typically associated with a prodrome.    We did detailed discussion regarding the potential etiologies for his presyncopal/syncopal episodes.  At this point in time I have recommended a comprehensive cardiac evaluation with an echocardiogram, a Lexiscan stress perfusion study and event monitoring.  I have also ordered carotid Dopplers to rule out significant peripheral vascular disease.    As we discussed, further recommendations will depend on the results of these investigations.  If they are unrevealing we may consider a reduction in his metoprolol dosage to allow for permissive hypertension as well as a neurology referral to rule out alternative etiologies.    It was a pleasure seeing him in consultation today.    Decision making was of high complexity.    The longitudinal plan of care for the diagnosis(es)/condition(s) as documented were addressed during this visit. Due to the added complexity in care, I will continue to support David in the subsequent management and with ongoing continuity of care.            Alberto Thacker M.D.       CURRENT MEDICATIONS:  Current Outpatient Medications   Medication Sig Dispense Refill    acetaminophen (TYLENOL) 500 MG tablet Take 1,000 mg by mouth      allopurinol (ZYLOPRIM) 100 MG tablet Take 1 tablet by mouth daily      budesonide-formoterol (SYMBICORT) 160-4.5 MCG/ACT Inhaler Inhale 2 puffs into the lungs      hydrOXYzine isabel (VISTARIL) 25 MG capsule Take 1 capsule (25 mg) by mouth 4 times daily as needed for itching 18 capsule 0     losartan (COZAAR) 50 MG tablet Take 1 tablet by mouth daily      methocarbamol (ROBAXIN) 750 MG tablet Take 750 mg by mouth      metoprolol succinate ER (TOPROL XL) 25 MG 24 hr tablet Take 1 tablet by mouth every morning      nitroGLYcerin (NITROSTAT) 0.4 MG sublingual tablet Place 0.4 mg under the tongue      omeprazole (PRILOSEC) 20 MG DR capsule Take 1 capsule by mouth daily      oxyCODONE (ROXICODONE) 5 MG tablet Take 1-2 tablets (5-10 mg) by mouth every 4 hours as needed for moderate to severe pain 24 tablet 0    QUEtiapine (SEROQUEL) 200 MG tablet Take 1 tablet by mouth at bedtime      simvastatin (ZOCOR) 40 MG tablet Take 1 tablet by mouth daily      traMADol (ULTRAM) 50 MG tablet Take 1 tablet by mouth every 6 hours as needed      traZODone (DESYREL) 50 MG tablet Take 50 mg by mouth at bedtime      warfarin ANTICOAGULANT (COUMADIN) 5 MG tablet Take 5 mg by mouth         ALLERGIES     Allergies   Allergen Reactions    Ace Inhibitors Other (See Comments) and Unknown     Comment: Cough, Description:     Other reaction(s): Other - Describe In Comment Field   Comment: Cough, Description:     Other reaction(s): Other - Describe In Comment Field   Comment: Cough, Description:    Comment: Cough, Description:    Atorvastatin Nausea    Indomethacin Swelling    Nefazodone      Depression    Venlafaxine Tinnitus     Other reaction(s): Tinnitus       PAST MEDICAL HISTORY:  No past medical history on file.    PAST SURGICAL HISTORY:  Past Surgical History:   Procedure Laterality Date    ARTHRODESIS WRIST Right 3/12/2024    Procedure: Right Wrist Scaphoid Excision, Triquetral Excision and Capitolunate Arthrodesis;  Surgeon: Samy Cheatham MD;  Location: [facility] OR    GRAFT BONE FROM ILIAC CREST Right 3/12/2024    Procedure: Iliac Crest Bone Graft Right;  Surgeon: Samy Cheatham MD;  Location: [facility] OR       FAMILY HISTORY:  No family history on file.    SOCIAL HISTORY:  Social History     Socioeconomic History     Marital status:      Social Drivers of Health     Financial Resource Strain: Not on File (2019)    Received from [facility]    Financial Resource Strain     Financial Resource Strain: 0   Food Insecurity: Not on File (2024)    Received from [facility]    Food Insecurity     Food: 0   Transportation Needs: Not on File (2019)    Received from [facility]    Transportation Needs     Transportation: 0   Physical Activity: Not on File (2019)    Received from [facility]    Physical Activity     Physical Activity: 0   Stress: Not on File (2019)    Received from [facility]    Stress     Stress: 0   Social Connections: Not on File (2024)    Received from [facility]    Social Connections     Connectedness: 0   Interpersonal Safety: Not At Risk (2025)    Received from  and [facility]     Do you feel UNSAFE in any of your personal relationships with your family members or any other acquaintances?: No   Housing Stability: Not on File (2019)    Received from [facility]    Housing Stability     Housin       Review of Systems:  Skin:          Eyes:         ENT:         Respiratory:          Cardiovascular:         Gastroenterology:        Genitourinary:         Musculoskeletal:         Neurologic:         Psychiatric:         Heme/Lymph/Imm:         Endocrine:           Physical Exam:  Vitals: There were no vitals taken for this visit.    Constitutional:           Skin:             Head:           Eyes:           Lymph:      ENT:           Neck:           Respiratory:            Cardiac:                                                           GI:           Extremities and Muscular Skeletal:                 Neurological:           Psych:           CC  Referred Self, MD  No address on file

## 2025-06-04 ENCOUNTER — OFFICE VISIT (OUTPATIENT)
Dept: CARDIOLOGY | Facility: CLINIC | Age: 70
End: 2025-06-04
Payer: MEDICARE

## 2025-06-04 VITALS
DIASTOLIC BLOOD PRESSURE: 85 MMHG | HEIGHT: 72 IN | HEART RATE: 63 BPM | WEIGHT: 269.3 LBS | SYSTOLIC BLOOD PRESSURE: 127 MMHG | OXYGEN SATURATION: 98 % | BODY MASS INDEX: 36.47 KG/M2 | RESPIRATION RATE: 16 BRPM

## 2025-06-04 DIAGNOSIS — R09.89 BRUIT: ICD-10-CM

## 2025-06-04 DIAGNOSIS — I48.0 PAROXYSMAL ATRIAL FIBRILLATION (H): Primary | ICD-10-CM

## 2025-06-04 NOTE — LETTER
6/4/2025    Dangelo Kilpatrick MD  [facility] [address]  [facility] [address]    RE: David Ortiz       Dear Colleague,     I had the pleasure of seeing David Ortiz in the [facility].      Reason for Consultation: Paroxysmal atrial fibrillation, recent emergency department visit for lightheadedness/presyncope.      History of Presenting Illness: This is a 70-year-old gentleman with a past medical history significant for paroxysmal atrial fibrillation and a prior pulmonary embolism as well as dyslipidemia who was seen in the emergency department in early May of this year for lightheadedness/presyncope.  His emergency department evaluation was essentially unremarkable and cardiology consultation was requested.    He states that he has a long history of presyncope as well as syncope over the past several years although the symptoms are becoming more frequent lately.  He describes a prodrome of dizziness prior to losing consciousness.  The symptoms can occur both post exertion and at rest, and he denies any preceding chest discomfort or palpitations.  He does, however, report dyspnea on exertion which is slightly more noticeable lately.    He has undergone a workup for this in the past in [place] which included cardiac monitoring per his recollection.  This was unrevealing.  He has also undergone coronary angiography in the past which demonstrated nonobstructive coronary artery disease.    He does monitor his blood pressure when the symptoms occur and per his wife his blood pressure has not demonstrated any significant changes with the symptoms.    PMH, FH, SH, Allergies and Meds: As outlined below.    Physical Exam:    Gen: NAD  Respiratory : Clear to Auscultation.  Cardiovascular: RRR, no murmurs  Extremities: No edema.    Labs: Lipid panel on 11/29/2023 demonstrated total cholesterol 148, HDL of 90, LDL of 39 and triglycerides of 94.    Other Cardiac  Testing:    He underwent a dobutamine stress echocardiogram on 8/17/2023 which was negative for ischemia.      IMPRESSION:    Recurrent presyncope/syncope as described above.  2.  Paroxysmal atrial fibrillation.  On Xarelto 15 mg daily due to chronic renal insufficiency.  3.  History of pulmonary embolism in the past.  On Xarelto as above.  4.  Mild to moderate nonobstructive coronary disease on coronary angiography in the past.  5.  Chronic renal insufficiency.          PLAN:    Mr. Ortiz presents for evaluation regarding recurrent syncope/presyncope as described above.  This has been ongoing for several years although it appears to be more frequent this year.  As stated above, the symptoms are typically associated with a prodrome.    We did detailed discussion regarding the potential etiologies for his presyncopal/syncopal episodes.  At this point in time I have recommended a comprehensive cardiac evaluation with an echocardiogram, a Lexiscan stress perfusion study and event monitoring.  I have also ordered carotid Dopplers to rule out significant peripheral vascular disease.    As we discussed, further recommendations will depend on the results of these investigations.  If they are unrevealing we may consider a reduction in his metoprolol dosage to allow for permissive hypertension as well as a neurology referral to rule out alternative etiologies.    It was a pleasure seeing him in consultation today.    Decision making was of high complexity.    The longitudinal plan of care for the diagnosis(es)/condition(s) as documented were addressed during this visit. Due to the added complexity in care, I will continue to support David in the subsequent management and with ongoing continuity of care.            Alberto Thacker M.D.       CURRENT MEDICATIONS:  Current Outpatient Medications   Medication Sig Dispense Refill     acetaminophen (TYLENOL) 500 MG tablet Take 1,000 mg by mouth       allopurinol (ZYLOPRIM) 100 MG  tablet Take 1 tablet by mouth daily       budesonide-formoterol (SYMBICORT) 160-4.5 MCG/ACT Inhaler Inhale 2 puffs into the lungs       hydrOXYzine isabel (VISTARIL) 25 MG capsule Take 1 capsule (25 mg) by mouth 4 times daily as needed for itching 18 capsule 0     losartan (COZAAR) 50 MG tablet Take 1 tablet by mouth daily       methocarbamol (ROBAXIN) 750 MG tablet Take 750 mg by mouth       metoprolol succinate ER (TOPROL XL) 25 MG 24 hr tablet Take 1 tablet by mouth every morning       nitroGLYcerin (NITROSTAT) 0.4 MG sublingual tablet Place 0.4 mg under the tongue       omeprazole (PRILOSEC) 20 MG DR capsule Take 1 capsule by mouth daily       oxyCODONE (ROXICODONE) 5 MG tablet Take 1-2 tablets (5-10 mg) by mouth every 4 hours as needed for moderate to severe pain 24 tablet 0     QUEtiapine (SEROQUEL) 200 MG tablet Take 1 tablet by mouth at bedtime       simvastatin (ZOCOR) 40 MG tablet Take 1 tablet by mouth daily       traMADol (ULTRAM) 50 MG tablet Take 1 tablet by mouth every 6 hours as needed       traZODone (DESYREL) 50 MG tablet Take 50 mg by mouth at bedtime       warfarin ANTICOAGULANT (COUMADIN) 5 MG tablet Take 5 mg by mouth         ALLERGIES     Allergies   Allergen Reactions     Ace Inhibitors Other (See Comments) and Unknown     Comment: Cough, Description:     Other reaction(s): Other - Describe In Comment Field   Comment: Cough, Description:     Other reaction(s): Other - Describe In Comment Field   Comment: Cough, Description:    Comment: Cough, Description:     Atorvastatin Nausea     Indomethacin Swelling     Nefazodone      Depression     Venlafaxine Tinnitus     Other reaction(s): Tinnitus       PAST MEDICAL HISTORY:  No past medical history on file.    PAST SURGICAL HISTORY:  Past Surgical History:   Procedure Laterality Date     ARTHRODESIS WRIST Right 3/12/2024    Procedure: Right Wrist Scaphoid Excision, Triquetral Excision and Capitolunate Arthrodesis;  Surgeon: Samy Cheatham MD;   Location: [facility] OR     GRAFT BONE FROM ILIAC CREST Right 3/12/2024    Procedure: Iliac Crest Bone Graft Right;  Surgeon: Samy Cheatham MD;  Location: [facility] OR       FAMILY HISTORY:  No family history on file.    SOCIAL HISTORY:  Social History     Socioeconomic History     Marital status:      Social Drivers of Health     Financial Resource Strain: Not on File (2019)    Received from [facility]    Financial Resource Strain      Financial Resource Strain: 0   Food Insecurity: Not on File (2024)    Received from [facility]    Food Insecurity      Food: 0   Transportation Needs: Not on File (2019)    Received from [facility]    Transportation Needs      Transportation: 0   Physical Activity: Not on File (2019)    Received from [facility]    Physical Activity      Physical Activity: 0   Stress: Not on File (2019)    Received from [facility]    Stress      Stress: 0   Social Connections: Not on File (2024)    Received from [facility]    Social Connections      Connectedness: 0   Interpersonal Safety: Not At Risk (2025)    [facility]      Do you feel UNSAFE in any of your personal relationships with your family members or any other acquaintances?: No   Housing Stability: Not on File (2019)    Received from [facility]    Housing Stability      Housin       Review of Systems:  Skin:          Eyes:         ENT:         Respiratory:          Cardiovascular:         Gastroenterology:        Genitourinary:         Musculoskeletal:         Neurologic:         Psychiatric:         Heme/Lymph/Imm:         Endocrine:           Physical Exam:  Vitals: There were no vitals taken for this visit.    Constitutional:           Skin:             Head:           Eyes:           Lymph:      ENT:           Neck:           Respiratory:            Cardiac:                                                           GI:           Extremities and Muscular  Skeletal:                 Neurological:           Psych:           CC  Referred Self, MD  No address on file                  Thank you for allowing me to participate in the care of your patient.      Sincerely,     Alberto Thacker MD     [facility]  cc:   Referred MD [place]  No address on file

## 2025-06-25 ENCOUNTER — HOSPITAL ENCOUNTER (OUTPATIENT)
Dept: CARDIOLOGY | Facility: CLINIC | Age: 70
Discharge: HOME | End: 2025-06-25
Attending: INTERNAL MEDICINE
Payer: MEDICARE

## 2025-06-25 ENCOUNTER — HOSPITAL ENCOUNTER (OUTPATIENT)
Dept: NUCLEAR MEDICINE | Facility: CLINIC | Age: 70
Setting detail: NUCLEAR MEDICINE
Discharge: HOME | End: 2025-06-25
Attending: INTERNAL MEDICINE
Payer: MEDICARE

## 2025-06-25 ENCOUNTER — HOSPITAL ENCOUNTER (OUTPATIENT)
Dept: CARDIOLOGY | Facility: CLINIC | Age: 70
Setting detail: NUCLEAR MEDICINE
Discharge: HOME | End: 2025-06-25
Attending: INTERNAL MEDICINE
Payer: MEDICARE

## 2025-06-25 ENCOUNTER — RESULTS FOLLOW-UP (OUTPATIENT)
Dept: CARDIOLOGY | Facility: CLINIC | Age: 70
End: 2025-06-25

## 2025-06-25 DIAGNOSIS — I77.810 ASCENDING AORTA DILATION: Primary | ICD-10-CM

## 2025-06-25 DIAGNOSIS — I77.89 AORTIC ROOT ENLARGEMENT: ICD-10-CM

## 2025-06-25 DIAGNOSIS — R09.89 BRUIT: ICD-10-CM

## 2025-06-25 DIAGNOSIS — I48.0 PAROXYSMAL ATRIAL FIBRILLATION (H): ICD-10-CM

## 2025-06-25 LAB
CV BLOOD PRESSURE: 62 MMHG
CV STRESS MAX HR HE: 78
NUC STRESS EJECTION FRACTION: 57 %
RATE PRESSURE PRODUCT: NORMAL
STRESS ECHO BASELINE DIASTOLIC HE: 70
STRESS ECHO BASELINE HR: 60 BPM
STRESS ECHO BASELINE SYSTOLIC BP: 122
STRESS ECHO CALCULATED PERCENT HR: 53 %
STRESS ECHO LAST STRESS DIASTOLIC BP: 70
STRESS ECHO LAST STRESS SYSTOLIC BP: 130
STRESS ECHO TARGET HR: 148
STRESS/REST PERFUSION RATIO: 0.99

## 2025-06-25 PROCEDURE — 78452 HT MUSCLE IMAGE SPECT MULT: CPT

## 2025-06-25 PROCEDURE — 343N000001 HC RX 343 MED OP 636: Performed by: INTERNAL MEDICINE

## 2025-06-25 PROCEDURE — 93880 EXTRACRANIAL BILAT STUDY: CPT

## 2025-06-25 PROCEDURE — 250N000011 HC RX IP 250 OP 636: Performed by: INTERNAL MEDICINE

## 2025-06-25 PROCEDURE — 93016 CV STRESS TEST SUPVJ ONLY: CPT | Performed by: INTERNAL MEDICINE

## 2025-06-25 PROCEDURE — A9502 TC99M TETROFOSMIN: HCPCS | Performed by: INTERNAL MEDICINE

## 2025-06-25 PROCEDURE — 93017 CV STRESS TEST TRACING ONLY: CPT

## 2025-06-25 RX ORDER — REGADENOSON 0.08 MG/ML
0.4 INJECTION, SOLUTION INTRAVENOUS ONCE
Status: COMPLETED | OUTPATIENT
Start: 2025-06-25 | End: 2025-06-25

## 2025-06-25 RX ADMIN — REGADENOSON 0.4 MG: 0.08 INJECTION, SOLUTION INTRAVENOUS at 08:39

## 2025-06-25 RX ADMIN — TETROFOSMIN 37.9 MILLICURIE: 1.38 INJECTION, POWDER, LYOPHILIZED, FOR SOLUTION INTRAVENOUS at 08:45

## 2025-06-25 RX ADMIN — TETROFOSMIN 12.5 MILLICURIE: 1.38 INJECTION, POWDER, LYOPHILIZED, FOR SOLUTION INTRAVENOUS at 07:35

## 2025-06-26 ENCOUNTER — RESULTS FOLLOW-UP (OUTPATIENT)
Dept: CARDIOLOGY | Facility: CLINIC | Age: 70
End: 2025-06-26

## 2025-06-26 ENCOUNTER — OFFICE VISIT (OUTPATIENT)
Dept: CARDIOLOGY | Facility: CLINIC | Age: 70
End: 2025-06-26
Payer: MEDICARE

## 2025-06-26 ENCOUNTER — HOSPITAL ENCOUNTER (OUTPATIENT)
Facility: CLINIC | Age: 70
End: 2025-06-26
Attending: INTERNAL MEDICINE | Admitting: INTERNAL MEDICINE
Payer: MEDICARE

## 2025-06-26 VITALS
RESPIRATION RATE: 16 BRPM | DIASTOLIC BLOOD PRESSURE: 74 MMHG | OXYGEN SATURATION: 94 % | SYSTOLIC BLOOD PRESSURE: 107 MMHG | HEART RATE: 60 BPM | WEIGHT: 265 LBS | BODY MASS INDEX: 35.94 KG/M2

## 2025-06-26 DIAGNOSIS — R55 SYNCOPE, UNSPECIFIED SYNCOPE TYPE: ICD-10-CM

## 2025-06-26 DIAGNOSIS — R94.39 ABNORMAL CARDIOVASCULAR STRESS TEST: Primary | ICD-10-CM

## 2025-06-26 DIAGNOSIS — N18.32 STAGE 3B CHRONIC KIDNEY DISEASE (H): ICD-10-CM

## 2025-06-26 LAB
ANION GAP SERPL CALCULATED.3IONS-SCNC: 13 MMOL/L (ref 7–15)
BUN SERPL-MCNC: 20.8 MG/DL (ref 8–23)
CALCIUM SERPL-MCNC: 9.5 MG/DL (ref 8.8–10.4)
CHLORIDE SERPL-SCNC: 106 MMOL/L (ref 98–107)
CREAT SERPL-MCNC: 1.96 MG/DL (ref 0.67–1.17)
EGFRCR SERPLBLD CKD-EPI 2021: 36 ML/MIN/1.73M2
GLUCOSE SERPL-MCNC: 88 MG/DL (ref 70–99)
HCO3 SERPL-SCNC: 21 MMOL/L (ref 22–29)
HGB BLD-MCNC: 15.4 G/DL (ref 13.3–17.7)
MCV RBC AUTO: 88 FL (ref 78–100)
POTASSIUM SERPL-SCNC: 4.7 MMOL/L (ref 3.4–5.3)
SODIUM SERPL-SCNC: 140 MMOL/L (ref 135–145)

## 2025-06-26 RX ORDER — LIDOCAINE 40 MG/G
CREAM TOPICAL
OUTPATIENT
Start: 2025-06-26

## 2025-06-26 RX ORDER — SODIUM CHLORIDE 9 MG/ML
INJECTION, SOLUTION INTRAVENOUS CONTINUOUS
OUTPATIENT
Start: 2025-06-26

## 2025-06-26 NOTE — PROGRESS NOTES
Coronary angiogram/PCI/Right Heart Cath prep instructions.     Patient is scheduled for a Coronary Angio w/possible PCI at [facility] - [address], [place] [address] - Main Entrance of the Hospital on 7/10/25.  Check in time is at 6:30am and procedure to follow.    Performing Cardiologist: Dr. Pickens    Patient instructed to remain NPO for solid foods 8 hours prior to arrival and may have clear liquids up to 2 hours prior to arrival.    Patient does require extra fluids prior to procedure and has been instructed to arrive at 6:30am    Patient is not diabetic.    Patient is on Xarelto (Eliquis, Pradaxa, Xarelto) and has been advised to hold for 2 days prior to procedure starting 7/8/25.  Patient will be given instruction after the procedure as to when to resume.    Patient is not on diuretics.     Patient is not currently taking ASA and has been advised to take one 325 mg tablet the day prior and morning of the procedure.    Pt is not on a SGLT2 inhibitor.    Pt is not on a GLP-1 Agonist    Patient advised to take their other daily medications the morning of the procedure with small sips of water.     Patient advised to shower the night before and morning of their procedure with regular soap.    Verified patient does not have a contrast allergy.    Verified patient has someone available to drive them home from the hospital and can stay with them for 24 hours after the procedure.     Patient advised they will have bedrest post procedure.  Length of time is 2-6 hours and dependent on access site used for procedure.  This bedrest is to allow proper clotting of the access site to prevent bleeding.    Patient advised to notify care team with any new COVID like symptoms prior to procedure. Day of procedure phone number: [place] at [phone]    Patient is aware of visitor policy.    Patient expresses understanding of above instructions and denies further questions at this  time.      Belkys Burleson RN  [facility]

## 2025-06-26 NOTE — PATIENT INSTRUCTIONS
SUMMARY:  LABS, ECHO, EVENT MONITOR TODAY  CORONARY ANGIOGRAM  CONSIDER LOOP RECORDER IN THE FUTURE  R[facility] FOLLOW UP AFTER ANGIOGRAM TO REVIEW    SAUL Espinosa, CNP   Nurse Practitioner  [facility] - [facility]    Coronary angiogram   [facility]      Please call clinic with any new COVID like symptoms prior to procedure.    2.   Coronary angiogram to be done at [facility] on 7/10/25 . Please arrive at 6:30am . If you need to contact [facility] for any reason, please call [phone] option #2.    Call the [facility] Nurse line with any questions [phone] Belkys RN, Kristi RN; Coral RN    In preparation for your procedure, we require that you do the following:    Please take a shower the night before and morning of your procedure with regular soap.    NO solid foods 8 hours prior to arrival and may have clear liquids up to 2 hours prior to arrival.    Take your usual morning medications with a small sip of water immediately upon arising on the morning of your procedure unless outlined below:    Aspirin:  If you currently do not take aspirin daily, please take 325mg aspirin the evening prior and the morning of procedure    Xarelto  STOP taking this medication 2 days prior to procedure Take your last dose on 7/7/25    Expect 2-6 hours bedrest post procedure dependent on access site used for procedure.  This bedrest is to allow proper clotting of the access site to prevent bleeding.    You will be unable to drive after your procedure; please arrange to have someone drive you home. Make sure that there is a responsible adult with you for 24 hours after your procedure. Your procedure will be cancelled if you do not have transportation home or someone staying with you for 24 hrs.    Your procedure will be done at [facility] located at [address] [place] [id number]. Please park in the  Skyway Ramp  on the west side of [facility]  "on [address]. Take the skyway over [address] to the hospital. Please check in on the first floor, \"Skyway Welcome Desk\" which is one floor down from the skyway level.    If you have any questions about your upcoming procedure, please contact nursing at [facility] clinic at [phone] or at [facility] at [phone].    "

## 2025-06-26 NOTE — PROGRESS NOTES
~Cardiology Clinic Visit~    Primary Cardiologist: Dr. Thacker  Reason for visit: H&P for Coronary Angiogram     History of Present Illness    David Ortiz is a very pleasant 70 year old male with a past medical history notable for paroxysmal atrial fibrillation and a prior pulmonary embolism as well as dyslipidemia who was seen in the emergency department in early May of this year for lightheadedness/presyncope.  His emergency department evaluation was essentially unremarkable and cardiology consultation was requested.     He states that he has a long history of presyncope as well as syncope over the past several years although the symptoms are becoming more frequent lately.  He describes a prodrome of dizziness prior to losing consciousness.  The symptoms can occur both post exertion and at rest, and he denies any preceding chest discomfort or palpitations.  He does, however, report dyspnea on exertion which is slightly more noticeable lately.     He has undergone a workup for this in the past in [place] which included cardiac monitoring per his recollection.  This was unrevealing.  He has also undergone coronary angiography in the past which demonstrated nonobstructive coronary artery disease.     He does monitor his blood pressure when the symptoms occur and per his wife his blood pressure has not demonstrated any significant changes with the symptoms.    Lexiscan stress 6/25/25:   The nuclear stress test is equivocal. There is a large fixed perfusion defect in the entire inferior wall segments, with relatively preserved wall thickening and significant diaphragm shadowing. Findings are favored to reflect severe diaphragmatic attenuation artifact, however non-transmural infarction cannot be excluded.  Left ventricular function is normal.  The left ventricular ejection fraction at rest is 62%.  The left ventricular ejection fraction at stress is 57%.  LV chamber size normal.  Stress to rest cavity  ratio is 0.99.  TID is absent.  There is no prior study for comparison.    This patient was urgently added to my schedule today to discuss the results from abnormal stress testing and to coordinate an invasive coronary angiogram at the recommendation of his primary cardiologist, Dr. Thacker.  He notes that he does not want to complete the heart monitor - does not like wearing them and feels as though nothing is ever found on these.  I listened to these concerns, we discussed ILR.    Recent BMP from May 2025 reviewed, Cr 2, GFR <30.  __________________________________________________________________         Assessment and Impression:     Recurrent presyncope/syncope  Persistent dizziness  Equivocal cardiac stress test  Paroxysmal atrial fibrillation.    On Xarelto 15 mg daily due to chronic renal insufficiency.  History of pulmonary embolism in the past.    On Xarelto as above.  Mild to moderate nonobstructive coronary disease on coronary angiography in the past.  Chronic renal insufficiency, stage 3         Recommendations and Plan:     Proceed with coronary angiogram, Tier 2  Risks and benefits of coronary angiogram discussed today including, bleeding, bruising, infection, allergic reaction, kidney damage (including need for dialysis), stroke, heart attack, vascular damage, emergency open heart surgery, up to and including death.  Patient indicates understanding and is agreeable to proceed.    Pt instructed to be NPO from solid foods 8-hours piror to check in time.  May have clear liquids up to 2-hours piror to check in time.  Pt has transportation and 24 hours post procedure monitoring set up.  Pt aware of no driving for 24 hours post procedure.  Discussed with patient, cancel event monitor.  Please place ILR at time of ICA.  Labs today (BMP, Hgb).  Discussed with support staff and will complete echocardiogram.  Consider ILR in future if no significant findings on heart monitor (would need EP referral).  RICHI follow up  after testing completed to review results.  __________________________________________________________________    OAC BRIDGING:    Regarding bridging for upcoming coronary angiogram.    I would not suggest bridging Xarelto for 1-3 day hold with patient's HX of Afib +DVT/PE.   DVT/PE is remote (~ 11/2013 from chart review) which is consistent with Chest Liliana-procedure guideline (CHEST 2022; 162(5):h852-x162).  Suggest resuming Xarelto ~ 24 hours post procedure due to immediate onset. In event, hospitalized post procedure and unable to restart Xarelto, would recommend DVT prophylaxis until able to resume.      Note patient has tolerated 5 day warfarin hold (with period of subtherapeutic post procedure) 2/2024 and 2 day Xarelto hold for colonoscopy 3/2025 without bridge    Mily Soto, PharmD  Anticoagulation Clinic  __________________________________________________________________    Thank you for the opportunity to participate in this pleasant patient's care.    We would be happy to see this patient sooner for any concerns in the meantime.    SAUL Espinosa, CNP, [facility]   Nurse Practitioner  [facility]    Today's clinic visit entailed:  The following tests were independently interpreted by me as noted in my documentation: stress test, labs  Ordering of each unique test  Prescription drug management  The level of medical decision making during this visit was of moderate complexity.  Review of the result(s) of each unique test - cardiac testing, cardiac imaging, labs  Care everywhere reviewed for additional records to facilitate comprehensive patient care.  Recent hospitalization records and notes reviewed to facilitate comprehensive care coordination.    Orders this Visit:  Orders Placed This Encounter   Procedures    Hemoglobin    Basic metabolic panel    Follow-Up with Cardiology- RICHI    Case Request Cath Lab: Coronary Angiogram    Case Request EP: Loop Recorder Implant     No orders of  the defined types were placed in this encounter.    There are no discontinued medications.  Encounter Diagnoses   Name Primary?    Abnormal cardiovascular stress test Yes    Stage 3b chronic kidney disease (H)     Syncope, unspecified syncope type      CURRENT MEDICATIONS:  Current Outpatient Medications   Medication Sig Dispense Refill    acetaminophen (TYLENOL) 500 MG tablet Take 1,000 mg by mouth      allopurinol (ZYLOPRIM) 100 MG tablet Take 1 tablet by mouth daily      budesonide-formoterol (SYMBICORT) 160-4.5 MCG/ACT Inhaler Inhale 2 puffs into the lungs      losartan (COZAAR) 50 MG tablet Take 1 tablet by mouth daily      metoprolol succinate ER (TOPROL XL) 25 MG 24 hr tablet Take 1 tablet by mouth every morning      nitroGLYcerin (NITROSTAT) 0.4 MG sublingual tablet Place 0.4 mg under the tongue      omeprazole (PRILOSEC) 20 MG DR capsule Take 1 capsule by mouth daily      QUEtiapine (SEROQUEL) 200 MG tablet Take 1 tablet by mouth at bedtime      rivaroxaban ANTICOAGULANT (XARELTO) 15 MG TABS tablet Take 15 mg by mouth.      simvastatin (ZOCOR) 40 MG tablet Take 1 tablet by mouth daily      traZODone (DESYREL) 50 MG tablet Take 50 mg by mouth at bedtime       ALLERGIES     Allergies   Allergen Reactions    Ace Inhibitors Other (See Comments) and Unknown     Comment: Cough, Description:     Other reaction(s): Other - Describe In Comment Field   Comment: Cough, Description:     Other reaction(s): Other - Describe In Comment Field   Comment: Cough, Description:    Comment: Cough, Description:    Atorvastatin Nausea    Indomethacin Swelling    Nefazodone      Depression    Venlafaxine Tinnitus     Other reaction(s): Tinnitus     PAST MEDICAL, SURGICAL, FAMILY, SOCIAL HISTORY:  History was reviewed and updated as needed, see medical record.    Review of Systems:  A 10-point Review Of Systems is otherwise normal except for that which is noted in the HPI and interval summary.    Physical Exam:    Vitals: /74  "(BP Location: Right arm, Patient Position: Sitting)   Pulse 60   Resp 16   Wt 120.2 kg (265 lb)   SpO2 94%   BMI 35.94 kg/m    Constitutional: Appears stated age, well nourished, NAD.  Respiratory: Non-labored. Lungs clear  Cardiovascular: RRR, normal S1 and S2. No murmur.  Noedema.  GI: Soft, non-distended, non-tender.  Skin: Warm and dry.   Musculoskeletal/Extremities: Symmetrical movement.  Neurologic: No gross focal deficits. Alert, awake.  Psychiatric: Affect appropriate. Mentation normal.    Recent Lab Results:  LIPID RESULTS:  No results found for: \"CHOL\", \"HDL\", \"LDL\", \"TRIG\", \"CHOLHDLRATIO\"  LIVER ENZYME RESULTS:  Lab Results   Component Value Date    AST 28 09/19/2006    ALT 44 09/19/2006     CBC RESULTS:  Lab Results   Component Value Date    WBC 9.1 03/12/2024    WBC 7.2 09/19/2006    RBC 5.33 03/12/2024    RBC 5.54 09/19/2006    HGB 15.0 03/12/2024    HGB 16.5 09/19/2006    HCT 46.6 03/12/2024    HCT 49.4 09/19/2006    MCV 87 03/12/2024    MCV 89 09/19/2006    MCH 28.1 03/12/2024    MCH 29.8 09/19/2006    MCHC 32.2 03/12/2024    MCHC 33.5 09/19/2006    RDW 16.4 (H) 03/12/2024    RDW 13.3 09/19/2006     03/12/2024     09/19/2006     BMP RESULTS:  Lab Results   Component Value Date     09/19/2006    POTASSIUM 4.2 09/19/2006    CHLORIDE 107 09/19/2006    CO2 24 09/19/2006    ANIONGAP 12.6 09/19/2006     09/19/2006    BUN 26 09/19/2006    CR 1.50 09/19/2006    GFRESTIMATED 52 (L) 09/19/2006    GFRESTBLACK 63 09/19/2006    JOJO 10.1 09/19/2006      A1C RESULTS:  No results found for: \"A1C\"  INR RESULTS:  Lab Results   Component Value Date    INR 1.12 03/12/2024       No results found for this or any previous visit (from the past 4320 hours).                   "

## 2025-06-26 NOTE — LETTER
6/26/2025    Dangelo Kilpatrick MD  [facility] [address]  [facility] [address]    RE: David Ortiz       Dear Colleague,     I had the pleasure of seeing David Ortiz in the [facility].              ~Cardiology Clinic Visit~    Primary Cardiologist: Dr. Thacker  Reason for visit: H&P for Coronary Angiogram     History of Present Illness    David Ortiz is a very pleasant 70 year old male with a past medical history notable for paroxysmal atrial fibrillation and a prior pulmonary embolism as well as dyslipidemia who was seen in the emergency department in early May of this year for lightheadedness/presyncope.  His emergency department evaluation was essentially unremarkable and cardiology consultation was requested.     He states that he has a long history of presyncope as well as syncope over the past several years although the symptoms are becoming more frequent lately.  He describes a prodrome of dizziness prior to losing consciousness.  The symptoms can occur both post exertion and at rest, and he denies any preceding chest discomfort or palpitations.  He does, however, report dyspnea on exertion which is slightly more noticeable lately.     He has undergone a workup for this in the past in [place] which included cardiac monitoring per his recollection.  This was unrevealing.  He has also undergone coronary angiography in the past which demonstrated nonobstructive coronary artery disease.     He does monitor his blood pressure when the symptoms occur and per his wife his blood pressure has not demonstrated any significant changes with the symptoms.    Lexiscan stress 6/25/25:   The nuclear stress test is equivocal. There is a large fixed perfusion defect in the entire inferior wall segments, with relatively preserved wall thickening and significant diaphragm shadowing. Findings are favored to reflect severe diaphragmatic attenuation artifact, however  non-transmural infarction cannot be excluded.  Left ventricular function is normal.  The left ventricular ejection fraction at rest is 62%.  The left ventricular ejection fraction at stress is 57%.  LV chamber size normal.  Stress to rest cavity ratio is 0.99.  TID is absent.  There is no prior study for comparison.    This patient was urgently added to my schedule today to discuss the results from abnormal stress testing and to coordinate an invasive coronary angiogram at the recommendation of his primary cardiologist, Dr. Thacker.  He notes that he does not want to complete the heart monitor - does not like wearing them and feels as though nothing is ever found on these.  I listened to these concerns, we discussed ILR.    Recent BMP from May 2025 reviewed, Cr 2, GFR <30.  __________________________________________________________________         Assessment and Impression:     Recurrent presyncope/syncope  Persistent dizziness  Equivocal cardiac stress test  Paroxysmal atrial fibrillation.    On Xarelto 15 mg daily due to chronic renal insufficiency.  History of pulmonary embolism in the past.    On Xarelto as above.  Mild to moderate nonobstructive coronary disease on coronary angiography in the past.  Chronic renal insufficiency, stage 3         Recommendations and Plan:     Proceed with coronary angiogram, Tier 2  Risks and benefits of coronary angiogram discussed today including, bleeding, bruising, infection, allergic reaction, kidney damage (including need for dialysis), stroke, heart attack, vascular damage, emergency open heart surgery, up to and including death.  Patient indicates understanding and is agreeable to proceed.    Pt instructed to be NPO from solid foods 8-hours piror to check in time.  May have clear liquids up to 2-hours piror to check in time.  Pt has transportation and 24 hours post procedure monitoring set up.  Pt aware of no driving for 24 hours post procedure.  Discussed with patient, cancel  event monitor.  Please place ILR at time of ICA.  Labs today (BMP, Hgb).  Discussed with support staff and will complete echocardiogram.  Consider ILR in future if no significant findings on heart monitor (would need EP referral).  [facility] follow up after testing completed to review results.  __________________________________________________________________    Thank you for the opportunity to participate in this pleasant patient's care.    We would be happy to see this patient sooner for any concerns in the meantime.    SAUL Espinosa, CNP, [facility]   Nurse Practitioner  [facility]    Today's clinic visit entailed:  The following tests were independently interpreted by me as noted in my documentation: stress test, labs  Ordering of each unique test  Prescription drug management  The level of medical decision making during this visit was of moderate complexity.  Review of the result(s) of each unique test - cardiac testing, cardiac imaging, labs  Care everywhere reviewed for additional records to facilitate comprehensive patient care.  Recent hospitalization records and notes reviewed to facilitate comprehensive care coordination.    Orders this Visit:  Orders Placed This Encounter   Procedures     Hemoglobin     Basic metabolic panel     Follow-Up with Cardiology- [facility]     Case Request Cath Lab: Coronary Angiogram     Case Request EP: Loop Recorder Implant     No orders of the defined types were placed in this encounter.    There are no discontinued medications.  Encounter Diagnoses   Name Primary?     Abnormal cardiovascular stress test Yes     Stage 3b chronic kidney disease (H)      Syncope, unspecified syncope type      CURRENT MEDICATIONS:  Current Outpatient Medications   Medication Sig Dispense Refill     acetaminophen (TYLENOL) 500 MG tablet Take 1,000 mg by mouth       allopurinol (ZYLOPRIM) 100 MG tablet Take 1 tablet by mouth daily       budesonide-formoterol (SYMBICORT) 160-4.5  MCG/ACT Inhaler Inhale 2 puffs into the lungs       losartan (COZAAR) 50 MG tablet Take 1 tablet by mouth daily       metoprolol succinate ER (TOPROL XL) 25 MG 24 hr tablet Take 1 tablet by mouth every morning       nitroGLYcerin (NITROSTAT) 0.4 MG sublingual tablet Place 0.4 mg under the tongue       omeprazole (PRILOSEC) 20 MG DR capsule Take 1 capsule by mouth daily       QUEtiapine (SEROQUEL) 200 MG tablet Take 1 tablet by mouth at bedtime       rivaroxaban ANTICOAGULANT (XARELTO) 15 MG TABS tablet Take 15 mg by mouth.       simvastatin (ZOCOR) 40 MG tablet Take 1 tablet by mouth daily       traZODone (DESYREL) 50 MG tablet Take 50 mg by mouth at bedtime       ALLERGIES     Allergies   Allergen Reactions     Ace Inhibitors Other (See Comments) and Unknown     Comment: Cough, Description:     Other reaction(s): Other - Describe In Comment Field   Comment: Cough, Description:     Other reaction(s): Other - Describe In Comment Field   Comment: Cough, Description:    Comment: Cough, Description:     Atorvastatin Nausea     Indomethacin Swelling     Nefazodone      Depression     Venlafaxine Tinnitus     Other reaction(s): Tinnitus     PAST MEDICAL, SURGICAL, FAMILY, SOCIAL HISTORY:  History was reviewed and updated as needed, see medical record.    Review of Systems:  A 10-point Review Of Systems is otherwise normal except for that which is noted in the HPI and interval summary.    Physical Exam:    Vitals: /74 (BP Location: Right arm, Patient Position: Sitting)   Pulse 60   Resp 16   Wt 120.2 kg (265 lb)   SpO2 94%   BMI 35.94 kg/m    Constitutional: Appears stated age, well nourished, NAD.  Respiratory: Non-labored. Lungs clear  Cardiovascular: RRR, normal S1 and S2. No murmur.  Noedema.  GI: Soft, non-distended, non-tender.  Skin: Warm and dry.   Musculoskeletal/Extremities: Symmetrical movement.  Neurologic: No gross focal deficits. Alert, awake.  Psychiatric: Affect appropriate. Mentation  "normal.    Recent Lab Results:  LIPID RESULTS:  No results found for: \"CHOL\", \"HDL\", \"LDL\", \"TRIG\", \"CHOLHDLRATIO\"  LIVER ENZYME RESULTS:  Lab Results   Component Value Date    AST 28 09/19/2006    ALT 44 09/19/2006     CBC RESULTS:  Lab Results   Component Value Date    WBC 9.1 03/12/2024    WBC 7.2 09/19/2006    RBC 5.33 03/12/2024    RBC 5.54 09/19/2006    HGB 15.0 03/12/2024    HGB 16.5 09/19/2006    HCT 46.6 03/12/2024    HCT 49.4 09/19/2006    MCV 87 03/12/2024    MCV 89 09/19/2006    MCH 28.1 03/12/2024    MCH 29.8 09/19/2006    MCHC 32.2 03/12/2024    MCHC 33.5 09/19/2006    RDW 16.4 (H) 03/12/2024    RDW 13.3 09/19/2006     03/12/2024     09/19/2006     BMP RESULTS:  Lab Results   Component Value Date     09/19/2006    POTASSIUM 4.2 09/19/2006    CHLORIDE 107 09/19/2006    CO2 24 09/19/2006    ANIONGAP 12.6 09/19/2006     09/19/2006    BUN 26 09/19/2006    CR 1.50 09/19/2006    GFRESTIMATED 52 (L) 09/19/2006    GFRESTBLACK 63 09/19/2006    JOJO 10.1 09/19/2006      A1C RESULTS:  No results found for: \"A1C\"  INR RESULTS:  Lab Results   Component Value Date    INR 1.12 03/12/2024       No results found for this or any previous visit (from the past 4320 hours).                   Thank you for allowing me to participate in the care of your patient.      Sincerely,     SAUL Espinosa [facility]  cc:   Alberto Thacker MD  [address]  [place] [id number]      "

## 2025-07-02 DIAGNOSIS — Z95.818 IMPLANTABLE LOOP RECORDER PRESENT: ICD-10-CM

## 2025-07-02 DIAGNOSIS — R55 SYNCOPE, UNSPECIFIED SYNCOPE TYPE: Primary | ICD-10-CM

## 2025-07-02 NOTE — PROGRESS NOTES
Called patient with pre-procedure instructions for Loop implant on 7/10/25 at 4:00pm    - Notify pt of arrival time of 3:00pm and location.  Procedure time is only an estimate.     Anticoagulation: (No hold for NOAC)     -Pt to stop solid foods 4 hrs before procedure (starting at 12:00pm).  -Stop Clear liquids 2 hrs before procedure (starting at 2:00pm).     -Instruct pt to shower the morning of the procedure, and then put on a clean shirt in order to help prevent infection.      -Instruct pt to bring their Apple ID or other cell phone ID equivalent for [employer] Loops. This assures that at home monitoring can be set up before pt leaves the hospital. Add to note if pt does not have cell.      -Notify that only a local anesthetic used. No need for  nor do they need to have someone stay with them overnight.     -Pt to call [facility] at [phone] if pt has any new flu or cold-like symptoms or if feeling unwell the evening prior or AM of procedure.     -Early 1 week device check scheduled for: 7/15/25 @ 10:10am at [facility] in [facility], Floor [address], [address] [place] [address]    Pt verbalized understanding of instructions.    DAVID Gipson      For Pre-Procedure Orders:     - No IV or IV fluids needed  - No Antibiotics needed   - No EKG needed  - No labs needed for Dr. Mckoy or Dr. Tyler (order for Iskos if not drawn in last year or if abnormal)  - No pregnancy test needed for any age  - No medication holds, including no hold on anticoagulant, antiplatelets, SGLT2 inhibitors, and GLP-1s      Staff Message from :        PACU then PICU

## 2025-07-10 ENCOUNTER — HOSPITAL ENCOUNTER (OUTPATIENT)
Facility: CLINIC | Age: 70
Discharge: HOME | End: 2025-07-10
Attending: INTERNAL MEDICINE | Admitting: INTERNAL MEDICINE
Payer: MEDICARE

## 2025-07-10 VITALS
TEMPERATURE: 98 F | WEIGHT: 265 LBS | SYSTOLIC BLOOD PRESSURE: 125 MMHG | DIASTOLIC BLOOD PRESSURE: 88 MMHG | RESPIRATION RATE: 16 BRPM | HEART RATE: 52 BPM | BODY MASS INDEX: 35.89 KG/M2 | HEIGHT: 72 IN | OXYGEN SATURATION: 94 %

## 2025-07-10 DIAGNOSIS — R94.39 ABNORMAL CARDIOVASCULAR STRESS TEST: ICD-10-CM

## 2025-07-10 DIAGNOSIS — R55 SYNCOPE, UNSPECIFIED SYNCOPE TYPE: ICD-10-CM

## 2025-07-10 LAB
ANION GAP SERPL CALCULATED.3IONS-SCNC: 12 MMOL/L (ref 7–15)
APTT PPP: 28 SECONDS (ref 22–38)
ATRIAL RATE - MUSE: 51 BPM
BUN SERPL-MCNC: 20.8 MG/DL (ref 8–23)
CALCIUM SERPL-MCNC: 9.5 MG/DL (ref 8.8–10.4)
CHLORIDE SERPL-SCNC: 107 MMOL/L (ref 98–107)
CREAT SERPL-MCNC: 1.91 MG/DL (ref 0.67–1.17)
DIASTOLIC BLOOD PRESSURE - MUSE: NORMAL MMHG
EGFRCR SERPLBLD CKD-EPI 2021: 37 ML/MIN/1.73M2
ERYTHROCYTE [DISTWIDTH] IN BLOOD BY AUTOMATED COUNT: 16.9 % (ref 10–15)
GLUCOSE SERPL-MCNC: 102 MG/DL (ref 70–99)
HCO3 SERPL-SCNC: 20 MMOL/L (ref 22–29)
HCT VFR BLD AUTO: 46.4 % (ref 40–53)
HGB BLD-MCNC: 15.2 G/DL (ref 13.3–17.7)
INR PPP: 1.04 (ref 0.85–1.15)
INTERPRETATION ECG - MUSE: NORMAL
MCH RBC QN AUTO: 29.3 PG (ref 26.5–33)
MCHC RBC AUTO-ENTMCNC: 32.8 G/DL (ref 31.5–36.5)
MCV RBC AUTO: 89 FL (ref 78–100)
P AXIS - MUSE: 57 DEGREES
PLATELET # BLD AUTO: 240 10E3/UL (ref 150–450)
POTASSIUM SERPL-SCNC: 4.3 MMOL/L (ref 3.4–5.3)
PR INTERVAL - MUSE: 182 MS
PROTHROMBIN TIME: 13.4 SECONDS (ref 11.8–14.8)
QRS DURATION - MUSE: 134 MS
QT - MUSE: 454 MS
QTC - MUSE: 418 MS
R AXIS - MUSE: -14 DEGREES
RBC # BLD AUTO: 5.19 10E6/UL (ref 4.4–5.9)
SODIUM SERPL-SCNC: 139 MMOL/L (ref 135–145)
SYSTOLIC BLOOD PRESSURE - MUSE: NORMAL MMHG
T AXIS - MUSE: 39 DEGREES
VENTRICULAR RATE- MUSE: 51 BPM
WBC # BLD AUTO: 7.6 10E3/UL (ref 4–11)

## 2025-07-10 PROCEDURE — 99152 MOD SED SAME PHYS/QHP 5/>YRS: CPT | Performed by: INTERNAL MEDICINE

## 2025-07-10 PROCEDURE — C1887 CATHETER, GUIDING: HCPCS | Performed by: INTERNAL MEDICINE

## 2025-07-10 PROCEDURE — 93454 CORONARY ARTERY ANGIO S&I: CPT | Mod: 26 | Performed by: INTERNAL MEDICINE

## 2025-07-10 PROCEDURE — 33285 INSJ SUBQ CAR RHYTHM MNTR: CPT | Performed by: INTERNAL MEDICINE

## 2025-07-10 PROCEDURE — 93005 ELECTROCARDIOGRAM TRACING: CPT

## 2025-07-10 PROCEDURE — 36415 COLL VENOUS BLD VENIPUNCTURE: CPT | Performed by: NURSE PRACTITIONER

## 2025-07-10 PROCEDURE — 85730 THROMBOPLASTIN TIME PARTIAL: CPT | Performed by: NURSE PRACTITIONER

## 2025-07-10 PROCEDURE — C1764 EVENT RECORDER, CARDIAC: HCPCS | Performed by: INTERNAL MEDICINE

## 2025-07-10 PROCEDURE — 258N000003 HC RX IP 258 OP 636: Performed by: NURSE PRACTITIONER

## 2025-07-10 PROCEDURE — 85610 PROTHROMBIN TIME: CPT | Performed by: NURSE PRACTITIONER

## 2025-07-10 PROCEDURE — C1894 INTRO/SHEATH, NON-LASER: HCPCS | Performed by: INTERNAL MEDICINE

## 2025-07-10 PROCEDURE — 999N000071 HC STATISTIC HEART CATH LAB OR EP LAB

## 2025-07-10 PROCEDURE — 93454 CORONARY ARTERY ANGIO S&I: CPT | Performed by: INTERNAL MEDICINE

## 2025-07-10 PROCEDURE — 999N000054 HC STATISTIC EKG NON-CHARGEABLE

## 2025-07-10 PROCEDURE — 85027 COMPLETE CBC AUTOMATED: CPT | Performed by: NURSE PRACTITIONER

## 2025-07-10 PROCEDURE — 80048 BASIC METABOLIC PNL TOTAL CA: CPT | Performed by: NURSE PRACTITIONER

## 2025-07-10 PROCEDURE — 999N000184 HC STATISTIC TELEMETRY

## 2025-07-10 PROCEDURE — 250N000011 HC RX IP 250 OP 636: Performed by: INTERNAL MEDICINE

## 2025-07-10 PROCEDURE — 272N000001 HC OR GENERAL SUPPLY STERILE: Performed by: INTERNAL MEDICINE

## 2025-07-10 PROCEDURE — 250N000009 HC RX 250: Performed by: INTERNAL MEDICINE

## 2025-07-10 DEVICE — MONITOR CARDIAC LUX-DX II+ M312: Type: IMPLANTABLE DEVICE | Status: FUNCTIONAL

## 2025-07-10 RX ORDER — IOPAMIDOL 755 MG/ML
INJECTION, SOLUTION INTRAVASCULAR
Status: DISCONTINUED | OUTPATIENT
Start: 2025-07-10 | End: 2025-07-10 | Stop reason: HOSPADM

## 2025-07-10 RX ORDER — OXYCODONE AND ACETAMINOPHEN 5; 325 MG/1; MG/1
1 TABLET ORAL EVERY 4 HOURS PRN
Refills: 0 | Status: CANCELLED | OUTPATIENT
Start: 2025-07-10

## 2025-07-10 RX ORDER — SODIUM CHLORIDE 9 MG/ML
INJECTION, SOLUTION INTRAVENOUS CONTINUOUS
Status: DISCONTINUED | OUTPATIENT
Start: 2025-07-10 | End: 2025-07-10 | Stop reason: HOSPADM

## 2025-07-10 RX ORDER — NALOXONE HYDROCHLORIDE 0.4 MG/ML
0.4 INJECTION, SOLUTION INTRAMUSCULAR; INTRAVENOUS; SUBCUTANEOUS
Status: DISCONTINUED | OUTPATIENT
Start: 2025-07-10 | End: 2025-07-10 | Stop reason: HOSPADM

## 2025-07-10 RX ORDER — LIDOCAINE 40 MG/G
CREAM TOPICAL
Status: DISCONTINUED | OUTPATIENT
Start: 2025-07-10 | End: 2025-07-10 | Stop reason: HOSPADM

## 2025-07-10 RX ORDER — NALOXONE HYDROCHLORIDE 0.4 MG/ML
0.4 INJECTION, SOLUTION INTRAMUSCULAR; INTRAVENOUS; SUBCUTANEOUS
Status: CANCELLED | OUTPATIENT
Start: 2025-07-10

## 2025-07-10 RX ORDER — VERAPAMIL HYDROCHLORIDE 2.5 MG/ML
INJECTION INTRAVENOUS
Status: DISCONTINUED | OUTPATIENT
Start: 2025-07-10 | End: 2025-07-10 | Stop reason: HOSPADM

## 2025-07-10 RX ORDER — NALOXONE HYDROCHLORIDE 0.4 MG/ML
0.2 INJECTION, SOLUTION INTRAMUSCULAR; INTRAVENOUS; SUBCUTANEOUS
Status: DISCONTINUED | OUTPATIENT
Start: 2025-07-10 | End: 2025-07-10 | Stop reason: HOSPADM

## 2025-07-10 RX ORDER — NITROGLYCERIN 5 MG/ML
VIAL (ML) INTRAVENOUS
Status: DISCONTINUED | OUTPATIENT
Start: 2025-07-10 | End: 2025-07-10 | Stop reason: HOSPADM

## 2025-07-10 RX ORDER — OXYCODONE HYDROCHLORIDE 5 MG/1
10 TABLET ORAL EVERY 4 HOURS PRN
Status: DISCONTINUED | OUTPATIENT
Start: 2025-07-10 | End: 2025-07-10 | Stop reason: HOSPADM

## 2025-07-10 RX ORDER — OXYCODONE HYDROCHLORIDE 5 MG/1
5 TABLET ORAL EVERY 4 HOURS PRN
Status: DISCONTINUED | OUTPATIENT
Start: 2025-07-10 | End: 2025-07-10 | Stop reason: HOSPADM

## 2025-07-10 RX ORDER — ATROPINE SULFATE 0.1 MG/ML
0.5 INJECTION INTRAVENOUS
Status: DISCONTINUED | OUTPATIENT
Start: 2025-07-10 | End: 2025-07-10 | Stop reason: HOSPADM

## 2025-07-10 RX ORDER — ACETAMINOPHEN 325 MG/1
650 TABLET ORAL EVERY 4 HOURS PRN
Status: DISCONTINUED | OUTPATIENT
Start: 2025-07-10 | End: 2025-07-10 | Stop reason: HOSPADM

## 2025-07-10 RX ORDER — FENTANYL CITRATE 50 UG/ML
25 INJECTION, SOLUTION INTRAMUSCULAR; INTRAVENOUS
Refills: 0 | Status: DISCONTINUED | OUTPATIENT
Start: 2025-07-10 | End: 2025-07-10 | Stop reason: HOSPADM

## 2025-07-10 RX ORDER — FENTANYL CITRATE 50 UG/ML
INJECTION, SOLUTION INTRAMUSCULAR; INTRAVENOUS
Status: DISCONTINUED | OUTPATIENT
Start: 2025-07-10 | End: 2025-07-10 | Stop reason: HOSPADM

## 2025-07-10 RX ORDER — NALOXONE HYDROCHLORIDE 0.4 MG/ML
0.2 INJECTION, SOLUTION INTRAMUSCULAR; INTRAVENOUS; SUBCUTANEOUS
Status: CANCELLED | OUTPATIENT
Start: 2025-07-10

## 2025-07-10 RX ORDER — FLUMAZENIL 0.1 MG/ML
0.2 INJECTION, SOLUTION INTRAVENOUS
Status: DISCONTINUED | OUTPATIENT
Start: 2025-07-10 | End: 2025-07-10 | Stop reason: HOSPADM

## 2025-07-10 RX ORDER — LIDOCAINE HYDROCHLORIDE AND EPINEPHRINE 10; 10 MG/ML; UG/ML
INJECTION, SOLUTION INFILTRATION; PERINEURAL
Status: DISCONTINUED | OUTPATIENT
Start: 2025-07-10 | End: 2025-07-10 | Stop reason: HOSPADM

## 2025-07-10 RX ORDER — HEPARIN SODIUM 1000 [USP'U]/ML
INJECTION, SOLUTION INTRAVENOUS; SUBCUTANEOUS
Status: DISCONTINUED | OUTPATIENT
Start: 2025-07-10 | End: 2025-07-10 | Stop reason: HOSPADM

## 2025-07-10 RX ADMIN — SODIUM CHLORIDE: 0.9 INJECTION, SOLUTION INTRAVENOUS at 07:02

## 2025-07-10 ASSESSMENT — ACTIVITIES OF DAILY LIVING (ADL)
ADLS_ACUITY_SCORE: 42

## 2025-07-10 NOTE — PROGRESS NOTES
[facility] Post Procedure Note    Patient Information  Name: David Ortiz  Age: 70 year old    Post Procedure  Time patient returned to Care [facility]es: 1015  Concerns/abnormal assessment: no, R wrist site with Tr band in place CDI, denies pain, CMS intact, VSS.   If abnormal assessment, provider notified: N/A  Plan/Other: per orders     [facility] Post Procedure Note    Patient Information  Name: David Ortiz  Age: 70 year old    Post Procedure  Time patient returned to [facility]: 1120  Concerns/abnormal assessment: no, loop site on chest CDI, denies pain   If abnormal assessment, provider notified: N/A  Plan/Other: per orders     Darlene Daniels RN

## 2025-07-10 NOTE — DISCHARGE INSTRUCTIONS
Loop Recorder Implant/Explant Discharge Instructions    After you go home:    Have an adult stay with you for 6 hours.  You may resume your normal diet.       For 24 hours - due to the sedation you received:  Relax and take it easy.  Do NOT make any important or legal decisions.  Do NOT drive or operate machines at home or at work.  Do NOT drink alcohol.    Care of Chest Incision:    Keep the bandage on for 3 days. You may remove the dressing on ***. Change it only if it gets loose or soaked. If you need to change it, use 4x4-inch gauze and a large clear bandage.   Leave the strips of tape on. They will fall off on their own, or we will remove them at your first check-up.  Check your wound daily for signs of infection, such as increased redness, severe swelling or draining. Fever may also be a sign of infection. Call us if you see any of these signs.  If there are no signs of infection, you may shower after the bandage comes off in 3 days. If you take a tub bath, keep the wound dry.  No soaking the incision (swimming pool, bathtub, hot tub) for 2 weeks.  You may have mild to medium pain for 3 to 5 days. Take Acetaminophen (Tylenol) or Ibuprofen (Advil) for the pain. If the pain persists or is severe, call us.    Activity:    Avoid strenuous activity until incision well healed.    Bleeding:    If you start bleeding from the incision site, sit down and press firmly on the site for 10 minutes.   Once bleeding stops, call [facility] as soon as you can.       Call 911 right away if you have heavy bleeding or bleeding that does not stop.      Medicines:    Take your medications, including blood thinners, unless your provider tells you not to.  If you have stopped any medicines, check with your provider about when to restart them.    Follow Up Appointments:    Follow up with Device Clinic at [facility] of patient preference as scheduled.    Call the clinic if:    You have a large or growing hard lump around the  site.  The site is red, swollen, hot or tender.  Blood or fluid is draining from the site.  You have chills or a fever greater than 101 F (38 C).  You feel dizzy or light-headed.  Questions or concerns.    Telling others about your device:    Before you leave the hospital, you will receive a temporary ID card. A permanent card will be mailed to you about 6 to 8 weeks later. Always carry the ID card with you. It has important details about your device.  You may also get a Medical Alert bracelet or tag that says you have a loop recorder.  Go to [email or web address].   Always tell doctors, dentists and other care providers that you have a device implanted in you.  Let us know before you plan any surgeries. Your care team must take special steps to keep you safe during certain procedures. These steps will depend on the type of device you have. Your provider will need to see your ID card. They may need to call us for instructions.    Device Safety:    Please refer to device  s booklet for further information.        [facility] in [place]: [phone]   Device Clinic (Mon-Fri 8am-4pm) [phone]  Or you may contact your provider via My Chart    The device clinic is closed on weekends & holidays.  Any calls received during this time will be answered on the next business day.  For any urgent questions after hours, please call the main clinic number and you will be put in contact with the cardiologist on call.    Cardiac Angiogram Discharge Instructions - Radial    After you go home:    Have an adult stay with you until tomorrow.  Drink extra fluids for 2 days.  You may resume your normal diet.  No smoking       For 24 hours - due to the sedation you received:  Relax and take it easy.  Do NOT make any important or legal decisions.  Do NOT drive or operate machines at home or at work.  Do NOT drink alcohol.    Care of Wrist Puncture Site:    For the first 24 hrs - check the puncture site every  1-2 hours while awake.  It is normal to have soreness at the puncture site and mild tingling in your hand for up to 3 days.  Remove the bandaid after 24 hours. If there is minor oozing, apply another bandaid and remove it after 12 hours.  You may shower tomorrow.  Do NOT take a bath, or use a hot tub or pool for at least 3 days. Do NOT scrub the site. Do not use lotion or powder near the puncture site.           Activity:        For 2 days:   do not use your hand or arm to support your weight (such as rising from a chair)   do not bend your wrist (such as lifting a garage door).  do not lift more than 5 pounds or exercise your arm (such as tennis, golf or bowling).  Do NOT do any heavy activity such as exercise, lifting, or straining.     Bleeding:    If you start bleeding from the site in your wrist, sit down and press firmly on/above the site for 10 minutes.   Once bleeding stops, keep arm still for 2 hours.   Call [facility] as soon as you can.       Call 911 right away if you have heavy bleeding or bleeding that does not stop.      Medicines:    If you are taking an antiplatelet medication such as Plavix, Brilinta or Effient, do not stop taking it until you talk to your cardiologist.      If you are on Metformin (Glucophage), do not restart it until you have blood tests (within 2 to 3 days after discharge).  After you have your blood drawn, you may restart the Metformin.   Take your medications, including blood thinners, unless your provider tells you not to.    If you take Coumadin (Warfarin), have your INR checked by your provider in  3-5 days. Call your clinic to schedule this.  If you have stopped any medicines, check with your provider about when to restart them.    Follow Up Appointments:    Follow up with [facility] Nurse Practitioner at [facility] of patient preference in 7-10 days.    Call the clinic if:    You have a large or growing hard lump around the site.  The site is red, swollen, hot or  tender.  Blood or fluid is draining from the site.  You have chills or a fever greater than 101 F (38 C).  Your arm feels numb, cool or changes color.  You have hives, a rash or unusual itching.  Any questions or concerns.          [facility] at [place]:    [phone] [facility] (7 days a week)    Or you may contact your provider via My Chart

## 2025-07-10 NOTE — PROGRESS NOTES
[facility] Admission Nursing Note    Patient Information  Name: David Ortiz  Age: 70 year old  Reason for admission: Angio and loop implant   [facility] arrival time: 0700    Visitor Information  Name: Kymberly ramirez      Patient Admission/Assessment   Pre-procedure assessment complete: Yes - L ankle more swollen then R due to previous multiple breaks   If abnormal assessment/labs, provider notified: N/A  NPO: Yes  Medications held per instructions/orders: Yes  Consent: obtained  Patient oriented to room: Yes  Education/questions answered: Yes  Plan/other: per orders     Discharge Planning  Discharge name/phone number: Kymberly ramirez [phone]  Overnight post sedation caregiver: kymberly  Discharge location: [place]    Darlene Daniels RN

## 2025-07-10 NOTE — PROGRESS NOTES
[facility] Discharge Nursing Note    Patient Information  Name: David Ortiz  Age: 70 year old    Discharge Education:  Discharge instructions reviewed: Yes  Additional education/resources provided: GLO information for loop device and spoke with rep   Patient/patient representative verbalizes understanding: Yes  Patient discharging on new medications: No  Medication education completed: Yes    Discharge Plans:   Discharge location: home  Discharge ride contacted: Yes  Approximate discharge time: 1320    Discharge Criteria:  Discharge criteria met and vital signs stable: Yes    Patient Belongs:  Patient belongings returned to patient: Yes    Darlene Daniels RN

## 2025-07-10 NOTE — PRE-PROCEDURE
GENERAL PRE-PROCEDURE:   Procedure:  Coronary angiogram and possible PCI  Date/Time:  7/10/2025 8:54 AM    Written consent obtained?: Yes    Risks and benefits: Risks, benefits and alternatives were discussed    Consent given by:  Patient  Patient states understanding of procedure being performed: Yes    Patient's understanding of procedure matches consent: Yes    Procedure consent matches procedure scheduled: Yes    Expected level of sedation:  Moderate  Appropriately NPO:  Yes  ASA Class:  2  History & Physical reviewed:  History and physical reviewed and no updates needed  Statement of review:  I have reviewed the lab findings, diagnostic data, medications, and the plan for sedation

## 2025-07-15 ENCOUNTER — HOSPITAL ENCOUNTER (OUTPATIENT)
Dept: CARDIOLOGY | Facility: CLINIC | Age: 70
Discharge: HOME | End: 2025-07-15
Attending: INTERNAL MEDICINE
Payer: MEDICARE

## 2025-07-15 DIAGNOSIS — Z95.818 IMPLANTABLE LOOP RECORDER PRESENT: ICD-10-CM

## 2025-07-15 DIAGNOSIS — R55 SYNCOPE, UNSPECIFIED SYNCOPE TYPE: ICD-10-CM

## 2025-07-15 LAB
MDC_IDC_EPISODE_DTM: NORMAL
MDC_IDC_EPISODE_ID: NORMAL
MDC_IDC_EPISODE_TYPE: NORMAL
MDC_IDC_MSMT_BATTERY_DTM: NORMAL
MDC_IDC_MSMT_BATTERY_STATUS: NORMAL
MDC_IDC_PG_IMPLANT_DTM: NORMAL
MDC_IDC_PG_MFG: NORMAL
MDC_IDC_PG_MODEL: NORMAL
MDC_IDC_PG_SERIAL: NORMAL
MDC_IDC_PG_TYPE: NORMAL
MDC_IDC_SESS_CLINIC_NAME: NORMAL
MDC_IDC_SESS_DTM: NORMAL
MDC_IDC_SESS_TYPE: NORMAL
MDC_IDC_STAT_AT_BURDEN_PERCENT: 0 %
MDC_IDC_STAT_AT_DTM_END: NORMAL
MDC_IDC_STAT_AT_DTM_START: NORMAL

## 2025-07-15 PROCEDURE — 93291 INTERROG DEV EVAL SCRMS IP: CPT

## 2025-08-11 ENCOUNTER — TELEPHONE (OUTPATIENT)
Dept: CARDIOLOGY | Facility: CLINIC | Age: 70
End: 2025-08-11
Payer: MEDICARE

## 2025-08-26 ENCOUNTER — TELEPHONE (OUTPATIENT)
Dept: CARDIOLOGY | Facility: CLINIC | Age: 70
End: 2025-08-26

## 2025-08-26 ENCOUNTER — ANCILLARY PROCEDURE (OUTPATIENT)
Dept: CARDIOLOGY | Facility: CLINIC | Age: 70
End: 2025-08-26
Attending: INTERNAL MEDICINE
Payer: MEDICARE

## 2025-08-26 DIAGNOSIS — R55 SYNCOPE, UNSPECIFIED SYNCOPE TYPE: ICD-10-CM

## 2025-08-26 DIAGNOSIS — I49.3 PVC'S (PREMATURE VENTRICULAR CONTRACTIONS): Primary | ICD-10-CM

## 2025-08-26 DIAGNOSIS — Z95.818 IMPLANTABLE LOOP RECORDER PRESENT: ICD-10-CM

## 2025-08-27 LAB
MDC_IDC_EPISODE_DTM: NORMAL
MDC_IDC_EPISODE_DTM: NORMAL
MDC_IDC_EPISODE_DURATION: 600 S
MDC_IDC_EPISODE_ID: NORMAL
MDC_IDC_EPISODE_ID: NORMAL
MDC_IDC_EPISODE_TYPE: NORMAL
MDC_IDC_EPISODE_TYPE: NORMAL
MDC_IDC_EPISODE_VENDOR_TYPE: NORMAL
MDC_IDC_MSMT_BATTERY_DTM: NORMAL
MDC_IDC_MSMT_BATTERY_STATUS: NORMAL
MDC_IDC_PG_IMPLANT_DTM: NORMAL
MDC_IDC_PG_MFG: NORMAL
MDC_IDC_PG_MODEL: NORMAL
MDC_IDC_PG_SERIAL: NORMAL
MDC_IDC_PG_TYPE: NORMAL
MDC_IDC_SESS_CLINIC_NAME: NORMAL
MDC_IDC_SESS_DTM: NORMAL
MDC_IDC_SESS_TYPE: NORMAL
MDC_IDC_STAT_AT_BURDEN_PERCENT: 0 %
MDC_IDC_STAT_AT_DTM_END: NORMAL
MDC_IDC_STAT_AT_DTM_START: NORMAL

## (undated) DEVICE — SU FIBERWIRE 3-0 18" AR-7227-01

## (undated) DEVICE — KIT HAND CONTROL ANGIOTOUCH ACIST 65CM AT-P65

## (undated) DEVICE — MANIFOLD KIT ANGIO AUTOMATED 014613

## (undated) DEVICE — GLOVE BIOGEL PI MICRO INDICATOR UNDERGLOVE SZ 7.0 48970

## (undated) DEVICE — PACK HAND

## (undated) DEVICE — PIN GUIDE 0.045X6" ACUTRAK WS-1106ST

## (undated) DEVICE — BNDG ELASTIC 2"X5YDS UNSTERILE 6611-20

## (undated) DEVICE — DEFIB PRO-PADZ LVP LQD GEL ADULT 8900-2105-01

## (undated) DEVICE — BNDG ELASTIC 4"X5YDS UNSTERILE 6611-40

## (undated) DEVICE — SOL WATER IRRIG 1000ML BOTTLE 07139-09

## (undated) DEVICE — BLADE KNIFE BEAVER 376700

## (undated) DEVICE — SU VICRYL 2-0 CT-1 36" UND J945H

## (undated) DEVICE — DECANTER VIAL 2006S

## (undated) DEVICE — DRSG GAUZE 4X4" TRAY

## (undated) DEVICE — SOL ADH LIQUID BENZOIN SWAB 0.6ML C1544

## (undated) DEVICE — SYR BULB IRRIG 50ML LATEX FREE 0035280

## (undated) DEVICE — TOTE ANGIO CORP PC15AT SAN32CC83O

## (undated) DEVICE — CATH DIAGNOSTIC RADIAL 5FR TIG 4.0

## (undated) DEVICE — SOL NACL 0.9% IRRIG 1000ML BOTTLE 07138-09

## (undated) DEVICE — DRSG STERI STRIP 1/2X4" R1547

## (undated) DEVICE — ESU PENCIL W/COATED BLADE E2450H

## (undated) DEVICE — GLOVE BIOGEL PI ULTRATOUCH G SZ 7.5 42175

## (undated) DEVICE — DRAPE IOBAN LG .375X23.5" 6648EZ

## (undated) DEVICE — BUR STRK ROUND 4.0X54MM FAST CUT 8 FLUTE 1608-006-137

## (undated) DEVICE — SLEEVE TR BAND RADIAL COMPRESSION DEVICE 29CM XX-RF06L

## (undated) DEVICE — DRAPE C-ARM MINI 110788

## (undated) DEVICE — GLOVE BIOGEL PI ULTRATOUCH G SZ 7.0 42170

## (undated) DEVICE — SUCTION MANIFOLD NEPTUNE 2 SYS 4 PORT 0702-020-000

## (undated) DEVICE — SU FIBERWIRE 4-0 T-13 18"  AR-7230-01

## (undated) DEVICE — BLADE KNIFE SURG 10 371110

## (undated) DEVICE — SU VICRYL 0 CT-2 27" UND J270H

## (undated) DEVICE — GOWN LG DISP 9515

## (undated) DEVICE — SU ETHILON 4-0 PS-2 18" 1667G

## (undated) DEVICE — INTRO GLIDESHEATH SLENDER 6FR 10X45CM 60-1060

## (undated) DEVICE — PREP SKIN SCRUB TRAY 4461A

## (undated) DEVICE — SU MONOCRYL 3-0 PS-2 18" UND Y497G

## (undated) RX ORDER — HEPARIN SODIUM 200 [USP'U]/100ML
INJECTION, SOLUTION INTRAVENOUS
Status: DISPENSED
Start: 2025-07-10

## (undated) RX ORDER — CEFAZOLIN SODIUM/WATER 2 G/20 ML
SYRINGE (ML) INTRAVENOUS
Status: DISPENSED
Start: 2024-03-12

## (undated) RX ORDER — HEPARIN SODIUM 1000 [USP'U]/ML
INJECTION, SOLUTION INTRAVENOUS; SUBCUTANEOUS
Status: DISPENSED
Start: 2025-07-10

## (undated) RX ORDER — CEFAZOLIN SODIUM 1 G/3ML
INJECTION, POWDER, FOR SOLUTION INTRAMUSCULAR; INTRAVENOUS
Status: DISPENSED
Start: 2024-03-12

## (undated) RX ORDER — NITROGLYCERIN 5 MG/ML
VIAL (ML) INTRAVENOUS
Status: DISPENSED
Start: 2025-07-10

## (undated) RX ORDER — REGADENOSON 0.08 MG/ML
INJECTION, SOLUTION INTRAVENOUS
Status: DISPENSED
Start: 2025-06-25

## (undated) RX ORDER — OXYCODONE HYDROCHLORIDE 5 MG/1
TABLET ORAL
Status: DISPENSED
Start: 2024-03-12

## (undated) RX ORDER — GABAPENTIN 100 MG/1
CAPSULE ORAL
Status: DISPENSED
Start: 2024-03-12

## (undated) RX ORDER — DEXAMETHASONE SODIUM PHOSPHATE 4 MG/ML
INJECTION, SOLUTION INTRA-ARTICULAR; INTRALESIONAL; INTRAMUSCULAR; INTRAVENOUS; SOFT TISSUE
Status: DISPENSED
Start: 2024-03-12

## (undated) RX ORDER — LIDOCAINE HYDROCHLORIDE 10 MG/ML
INJECTION, SOLUTION EPIDURAL; INFILTRATION; INTRACAUDAL; PERINEURAL
Status: DISPENSED
Start: 2025-07-10

## (undated) RX ORDER — FENTANYL CITRATE 50 UG/ML
INJECTION, SOLUTION INTRAMUSCULAR; INTRAVENOUS
Status: DISPENSED
Start: 2024-03-12

## (undated) RX ORDER — PROPOFOL 10 MG/ML
INJECTION, EMULSION INTRAVENOUS
Status: DISPENSED
Start: 2024-03-12

## (undated) RX ORDER — BUPIVACAINE HYDROCHLORIDE 5 MG/ML
INJECTION, SOLUTION EPIDURAL; INTRACAUDAL
Status: DISPENSED
Start: 2024-03-12

## (undated) RX ORDER — ACETAMINOPHEN 325 MG/1
TABLET ORAL
Status: DISPENSED
Start: 2024-03-12

## (undated) RX ORDER — ONDANSETRON 2 MG/ML
INJECTION INTRAMUSCULAR; INTRAVENOUS
Status: DISPENSED
Start: 2024-03-12

## (undated) RX ORDER — FENTANYL CITRATE 50 UG/ML
INJECTION, SOLUTION INTRAMUSCULAR; INTRAVENOUS
Status: DISPENSED
Start: 2025-07-10